# Patient Record
Sex: FEMALE | Race: BLACK OR AFRICAN AMERICAN | Employment: UNEMPLOYED | URBAN - METROPOLITAN AREA
[De-identification: names, ages, dates, MRNs, and addresses within clinical notes are randomized per-mention and may not be internally consistent; named-entity substitution may affect disease eponyms.]

---

## 2017-02-15 ENCOUNTER — HOSPITAL ENCOUNTER (EMERGENCY)
Facility: CLINIC | Age: 47
Discharge: HOME | End: 2017-02-15
Attending: EMERGENCY MEDICINE | Admitting: EMERGENCY MEDICINE
Payer: MEDICAID

## 2017-02-15 VITALS
TEMPERATURE: 98.1 F | DIASTOLIC BLOOD PRESSURE: 91 MMHG | RESPIRATION RATE: 20 BRPM | SYSTOLIC BLOOD PRESSURE: 147 MMHG | HEART RATE: 57 BPM | OXYGEN SATURATION: 100 %

## 2017-02-15 DIAGNOSIS — F32.A DEPRESSION, UNSPECIFIED DEPRESSION TYPE: ICD-10-CM

## 2017-02-15 DIAGNOSIS — F32.89 MINOR DEPRESSIVE DISORDER: Primary | ICD-10-CM

## 2017-02-15 DIAGNOSIS — F19.10 DRUG ABUSE (H): ICD-10-CM

## 2017-02-15 DIAGNOSIS — F19.10 POLYSUBSTANCE ABUSE (H): ICD-10-CM

## 2017-02-15 LAB
AMPHETAMINES UR QL SCN: NORMAL
BARBITURATES UR QL: NORMAL
BENZODIAZ UR QL: NORMAL
CANNABINOIDS UR QL SCN: NORMAL
COCAINE UR QL: NORMAL
ETHANOL UR QL SCN: NORMAL
HCG UR QL: NEGATIVE
OPIATES UR QL SCN: NORMAL

## 2017-02-15 PROCEDURE — 90791 PSYCH DIAGNOSTIC EVALUATION: CPT

## 2017-02-15 PROCEDURE — 99285 EMERGENCY DEPT VISIT HI MDM: CPT | Mod: 25 | Performed by: EMERGENCY MEDICINE

## 2017-02-15 PROCEDURE — 80320 DRUG SCREEN QUANTALCOHOLS: CPT | Performed by: EMERGENCY MEDICINE

## 2017-02-15 PROCEDURE — 80307 DRUG TEST PRSMV CHEM ANLYZR: CPT | Performed by: EMERGENCY MEDICINE

## 2017-02-15 PROCEDURE — 81025 URINE PREGNANCY TEST: CPT | Performed by: EMERGENCY MEDICINE

## 2017-02-15 PROCEDURE — 99284 EMERGENCY DEPT VISIT MOD MDM: CPT | Mod: Z6 | Performed by: EMERGENCY MEDICINE

## 2017-02-15 RX ORDER — FLUOXETINE 20 MG/5ML
40 SOLUTION ORAL DAILY
Qty: 300 ML | Refills: 0 | Status: ON HOLD | OUTPATIENT
Start: 2017-02-15 | End: 2020-04-25

## 2017-02-15 ASSESSMENT — ENCOUNTER SYMPTOMS
NERVOUS/ANXIOUS: 0
HALLUCINATIONS: 0
SLEEP DISTURBANCE: 0
DYSPHORIC MOOD: 1

## 2017-02-15 NOTE — ED AVS SNAPSHOT
[facility], Emergency Department    [address]    [facility] [place] [address]    Phone:  [phone]    Fax:  [phone]                                       Iliana Barrera   MRN: [id number]    Department:  [facility], Emergency Department   Date of Visit:  2/15/2017           Patient Information     Date Of Birth          1970        Your diagnoses for this visit were:     Depression, unspecified depression type     Polysubstance abuse        You were seen by Vernell Jenkins MD.        Discharge Instructions       Prozac refill given.     We spoke to [facility].  They said you can come back tomorrow morning between 6-10:30 am and they will see you and complete today's intended visit.     24 Hour Appointment Hotline       To make an appointment at any [facility], call [phone] ([phone]). If you don't have a family doctor or clinic, we will help you find one. [facility] are conveniently located to serve the needs of you and your family.             Review of your medicines      CONTINUE these medicines which may have CHANGED, or have new prescriptions. If we are uncertain of the size of tablets/capsules you have at home, strength may be listed as something that might have changed.        Dose / Directions Last dose taken    * FLUoxetine 20 MG/5ML solution   Commonly known as:  PROzac   Dose:  20 mg   What changed:  Another medication with the same name was added. Make sure you understand how and when to take each.   Quantity:  150 mL        Take 5 mLs (20 mg) by mouth daily   Refills:  2        * FLUoxetine 20 MG/5ML solution   Commonly known as:  PROzac   Dose:  40 mg   What changed:  You were already taking a medication with the same name, and this prescription was added. Make sure you understand how and when to take each.   Quantity:  300 mL        Take 10 mLs (40 mg) by mouth daily   Refills:  0        * Notice:  This list has 2 medication(s) that are the same as  other medications prescribed for you. Read the directions carefully, and ask your doctor or other care provider to review them with you.      Our records show that you are taking the medicines listed below. If these are incorrect, please call your family doctor or clinic.        Dose / Directions Last dose taken    acamprosate 333 MG EC tablet   Commonly known as:  CAMPRAL   Dose:  666 mg   Quantity:  180 tablet        Take 2 tablets (666 mg) by mouth 3 times daily   Refills:  2        albuterol 108 (90 BASE) MCG/ACT Inhaler   Commonly known as:  PROAIR HFA/PROVENTIL HFA/VENTOLIN HFA   Dose:  2 puff   Quantity:  1 Inhaler        Inhale 2 puffs into the lungs every 6 hours as needed for shortness of breath / dyspnea or wheezing   Refills:  12        ALLEGRA PO        Refills:  0        calcium carbonate 1250 (500 CA) MG/5ML Susp suspension        Refills:  0        cetirizine 10 MG tablet   Commonly known as:  zyrTEC   Dose:  10 mg   Quantity:  100 tablet        Take 1 tablet (10 mg) by mouth every evening   Refills:  11        cholecalciferol 70476 UNITS capsule   Commonly known as:  VITAMIN D3   Dose:  1 capsule   Quantity:  8 capsule        Take 1 capsule (50,000 Units) by mouth once a week   Refills:  1        ferrous sulfate 325 (65 FE) MG tablet   Commonly known as:  IRON   Dose:  325 mg   Quantity:  180 tablet        Take 1 tablet (325 mg) by mouth 2 times daily   Refills:  3        folic acid 1 MG tablet   Commonly known as:  FOLVITE   Dose:  1 mg   Quantity:  30 tablet        Take 1 tablet (1 mg) by mouth daily   Refills:  2        gabapentin 250 MG/5ML solution   Commonly known as:  NEURONTIN   Dose:  600 mg   Quantity:  450 mL        Take 12 mLs (600 mg) by mouth 3 times daily   Refills:  2        ibuprofen 200 MG tablet   Commonly known as:  ADVIL/MOTRIN   Dose:  600 mg   Quantity:  30 tablet        Take 3 tablets (600 mg) by mouth every 4 hours as needed   Refills:  0        MULTIVITAMIN Liqd        1  per day   Refills:  0        polyethylene glycol Packet   Commonly known as:  MIRALAX/GLYCOLAX   Dose:  17 g   Quantity:  30 packet        Take 17 g by mouth daily   Refills:  2        prazosin 1 MG capsule   Commonly known as:  MINIPRESS   Dose:  1 mg   Quantity:  30 capsule        Take 1 capsule (1 mg) by mouth At Bedtime   Refills:  2        * VITAMIN B 12 PO        Refills:  0        * cyanocobalamin 1000 MCG/ML injection   Commonly known as:  VITAMIN B12   Dose:  1 mL   Quantity:  1 mL        Inject 1 mL (1,000 mcg) into the muscle every 30 days   Refills:  12        ZANTAC 150 MG tablet   Dose:  1 tablet   Generic drug:  ranitidine        Take 1 tablet by mouth daily   Refills:  0        * Notice:  This list has 2 medication(s) that are the same as other medications prescribed for you. Read the directions carefully, and ask your doctor or other care provider to review them with you.            Prescriptions were sent or printed at these locations (1 Prescription)                   Other Prescriptions                Printed at Department/Unit printer (1 of 1)         FLUoxetine (PROZAC) 20 MG/5ML solution                Procedures and tests performed during your visit     Drug abuse screen 6 urine (tox)    HCG qualitative urine      Orders Needing Specimen Collection     None      Pending Results     No orders found from 2/13/2017 to 2/16/2017.            Pending Culture Results     No orders found from 2/13/2017 to 2/16/2017.            Thank you for choosing [facility]       Thank you for choosing [facility] for your care. Our goal is always to provide you with excellent care. Hearing back from our patients is one way we can continue to improve our services. Please take a few minutes to complete the written survey that you may receive in the mail after you visit with us. Thank you!        [employer] Information     [employer] lets you send messages to your doctor, view your test results, renew your prescriptions,  "schedule appointments and more. To sign up, go to [email or web address] . Click on \"Log in\" on the left side of the screen, which will take you to the Welcome page. Then click on \"Sign up Now\" on the right side of the page.     You will be asked to enter the access code listed below, as well as some personal information. Please follow the directions to create your username and password.     Your access code is: [email or web address]  Expires: 2017  1:21 PM     Your access code will  in 90 days. If you need help or a new code, please call your [facility] clinic or [phone].        Care EveryWhere ID     This is your Care EveryWhere ID. This could be used by other organizations to access your [facility] medical records  [id number]        After Visit Summary       This is your record. Keep this with you and show to your community pharmacist(s) and doctor(s) at your next visit.                  "

## 2017-02-15 NOTE — ED AVS SNAPSHOT
[facility], [place], Emergency Department    [address]    [facility] [address]    Phone:  [phone]    Fax:  [phone]                                       Iliana Barrera   MRN: [id number]    Department:  [facility], Emergency Department   Date of Visit:  2/15/2017           After Visit Summary Signature Page     I have received my discharge instructions, and my questions have been answered. I have discussed any challenges I see with this plan with the nurse or doctor.    ..........................................................................................................................................  Patient/Patient Representative Signature      ..........................................................................................................................................  Patient Representative Print Name and Relationship to Patient    ..................................................               ................................................  Date                                            Time    ..........................................................................................................................................  Reviewed by Signature/Title    ...................................................              ..............................................  Date                                                            Time

## 2017-02-15 NOTE — ED NOTES
Patient arrives to [facility]. Psych Associate explains process, gives patient urine cup and questionnaire. Patient told about meeting with Mental Health  and Psychiatrist. Patient told about 2-5 hour time frame for complete evaluation.

## 2017-02-15 NOTE — ED NOTES
Bed: ED09  Expected date: 2/15/17  Expected time: 11:18 AM  Means of arrival: Ambulance  Comments:  [facility]  48 yo Fe  depression

## 2017-02-15 NOTE — DISCHARGE INSTRUCTIONS
Prozac refill given.     We spoke to [facility].  They said you can come back tomorrow morning between 6-10:30 am and they will see you and complete today's intended visit.

## 2017-02-15 NOTE — ED PROVIDER NOTES
History     Chief Complaint   Patient presents with     Suicidal     off meds for 4 days, Hallucinations also.      DAILY Barrera is a 47 year old female with hx of depression and polysubstance abuse who presents from [facility] for safety evaluation.  She states she was there today to get set up with methadone maintenance. She recently had a rule 25 assessment and was referred to [facility].   They asked her a series of questions and asked if she had a suicidal plan.  She says she has one but doesn't have any plan or intention to due it.  She was there to get help and get better and they made her come here.  She takes prozac for depression and hasn't taken it for 4 days because it's locked up [employer] in a locked and she keeps missing the visiting times to get her med.  She locks it up there because she is staying at [facility] and things get stolen there.  She is homeless.  She has been living on the streets since December.   She drinks alcohol excessive and uses opiate pills.  She hasn't taken anything for at least 1 week.  She has chronic back pain.  She went through [facility] in 2015.     I have reviewed the Medications, Allergies, Past Medical and Surgical History, and Social History in the Epic system.    Review of Systems   Psychiatric/Behavioral: Positive for dysphoric mood. Negative for hallucinations, self-injury, sleep disturbance and suicidal ideas. The patient is not nervous/anxious.    All other systems reviewed and are negative.      Physical Exam   BP: (!) 147/91  Pulse: 57  Temp: 98.1  F (36.7  C)  Resp: 20  SpO2: 100 %  Physical Exam   Constitutional: She is oriented to person, place, and time. She appears well-developed and well-nourished. No distress.   HENT:   Head: Normocephalic and atraumatic.   Right Ear: External ear normal.   Left Ear: External ear normal.   Eyes: EOM are normal.   Neck: Normal range of motion. Neck supple.   Cardiovascular: Normal rate,  regular rhythm and normal heart sounds.    Pulmonary/Chest: Effort normal and breath sounds normal.   Abdominal: Soft. There is no tenderness.   Musculoskeletal: Normal range of motion.   Neurological: She is alert and oriented to person, place, and time.   Skin: Skin is warm and dry. She is not diaphoretic.   Psychiatric: Her speech is normal and behavior is normal. Judgment and thought content normal. Cognition and memory are normal. She exhibits a depressed mood.   Nursing note and vitals reviewed.      ED Course     ED Course     Procedures             Labs Ordered and Resulted from Time of ED Arrival Up to the Time of Departure from the ED   HCG QUALITATIVE URINE   DRUG ABUSE SCREEN 6 CHEM DEP URINE ([facility])       Assessments & Plan (with Medical Decision Making)   The patient presents from [facility] for safety evaluation.  She was there today to get set up on methadone maintenance.  During the evaluation they asked routine safety questions and she told them she had a suicidal plan.  She says she has thought about suicide but has no intention of doing it.  She was just being honest.  She was thinking the methadone was going to help here.  She was seen by myself and the DEC .  We do not feel she meets criteria for admission and is not a safety risk.  I will refill her prozac so she can resume taking it.  We spoke to [place] and then can see the patient tomorrow am between 6-11 to continue today's intended assessment.  The patient was d/c.     I have reviewed the nursing notes.    I have reviewed the findings, diagnosis, plan and need for follow up with the patient.    New Prescriptions    FLUOXETINE (PROZAC) 20 MG/5ML SOLUTION    Take 10 mLs (40 mg) by mouth daily       Final diagnoses:   Depression, unspecified depression type   Polysubstance abuse       2/15/2017   [facility] [place], EMERGENCY DEPARTMENT     Vernell Jenkins MD  02/15/17 2128

## 2019-05-08 ENCOUNTER — TRANSFERRED RECORDS (OUTPATIENT)
Dept: HEALTH INFORMATION MANAGEMENT | Facility: CLINIC | Age: 49
End: 2019-05-08

## 2020-04-23 ENCOUNTER — TELEPHONE (OUTPATIENT)
Dept: BEHAVIORAL HEALTH | Facility: CLINIC | Age: 50
End: 2020-04-23

## 2020-04-23 ENCOUNTER — HOSPITAL ENCOUNTER (INPATIENT)
Facility: CLINIC | Age: 50
LOS: 9 days | Discharge: HOME | End: 2020-05-02
Attending: EMERGENCY MEDICINE | Admitting: PSYCHIATRY & NEUROLOGY
Payer: COMMERCIAL

## 2020-04-23 DIAGNOSIS — R79.89 ELEVATED LFTS: ICD-10-CM

## 2020-04-23 DIAGNOSIS — F33.3 SEVERE EPISODE OF RECURRENT MAJOR DEPRESSIVE DISORDER, WITH PSYCHOTIC FEATURES (H): ICD-10-CM

## 2020-04-23 DIAGNOSIS — B99.9 INFECTION: Primary | ICD-10-CM

## 2020-04-23 DIAGNOSIS — R44.0 AUDITORY HALLUCINATIONS: ICD-10-CM

## 2020-04-23 DIAGNOSIS — F10.229 ALCOHOL INTOXICATION IN ACTIVE ALCOHOLIC WITH COMPLICATION (H): ICD-10-CM

## 2020-04-23 DIAGNOSIS — R45.851 SUICIDAL IDEATION: ICD-10-CM

## 2020-04-23 DIAGNOSIS — F10.24 ALCOHOL DEPENDENCE WITH ALCOHOL-INDUCED MOOD DISORDER (H): ICD-10-CM

## 2020-04-23 LAB
ALBUMIN SERPL-MCNC: 3.9 G/DL (ref 3.4–5)
ALCOHOL BREATH TEST: 0.18 (ref 0–0.01)
ALCOHOL BREATH TEST: 0.29 (ref 0–0.01)
ALP SERPL-CCNC: 101 U/L (ref 40–150)
ALT SERPL W P-5'-P-CCNC: 62 U/L (ref 0–50)
AMPHETAMINES UR QL SCN: NEGATIVE
ANION GAP SERPL CALCULATED.3IONS-SCNC: 6 MMOL/L (ref 3–14)
AST SERPL W P-5'-P-CCNC: 116 U/L (ref 0–45)
BARBITURATES UR QL: NEGATIVE
BASOPHILS # BLD AUTO: 0 10E9/L (ref 0–0.2)
BASOPHILS NFR BLD AUTO: 0.3 %
BENZODIAZ UR QL: NEGATIVE
BILIRUB SERPL-MCNC: 0.3 MG/DL (ref 0.2–1.3)
BUN SERPL-MCNC: 13 MG/DL (ref 7–30)
CALCIUM SERPL-MCNC: 9.4 MG/DL (ref 8.5–10.1)
CANNABINOIDS UR QL SCN: NEGATIVE
CHLORIDE SERPL-SCNC: 103 MMOL/L (ref 94–109)
CO2 SERPL-SCNC: 27 MMOL/L (ref 20–32)
COCAINE UR QL: NEGATIVE
CREAT SERPL-MCNC: 0.44 MG/DL (ref 0.52–1.04)
DIFFERENTIAL METHOD BLD: NORMAL
EOSINOPHIL # BLD AUTO: 0.1 10E9/L (ref 0–0.7)
EOSINOPHIL NFR BLD AUTO: 0.6 %
ERYTHROCYTE [DISTWIDTH] IN BLOOD BY AUTOMATED COUNT: 13.7 % (ref 10–15)
ETHANOL UR QL SCN: POSITIVE
GFR SERPL CREATININE-BSD FRML MDRD: >90 ML/MIN/{1.73_M2}
GLUCOSE SERPL-MCNC: 108 MG/DL (ref 70–99)
HCG UR QL: NEGATIVE
HCT VFR BLD AUTO: 39.9 % (ref 35–47)
HGB BLD-MCNC: 13.4 G/DL (ref 11.7–15.7)
IMM GRANULOCYTES # BLD: 0 10E9/L (ref 0–0.4)
IMM GRANULOCYTES NFR BLD: 0.1 %
LYMPHOCYTES # BLD AUTO: 2.8 10E9/L (ref 0.8–5.3)
LYMPHOCYTES NFR BLD AUTO: 36 %
MCH RBC QN AUTO: 28.4 PG (ref 26.5–33)
MCHC RBC AUTO-ENTMCNC: 33.6 G/DL (ref 31.5–36.5)
MCV RBC AUTO: 85 FL (ref 78–100)
MONOCYTES # BLD AUTO: 0.3 10E9/L (ref 0–1.3)
MONOCYTES NFR BLD AUTO: 3.2 %
NEUTROPHILS # BLD AUTO: 4.7 10E9/L (ref 1.6–8.3)
NEUTROPHILS NFR BLD AUTO: 59.8 %
NRBC # BLD AUTO: 0 10*3/UL
NRBC BLD AUTO-RTO: 0 /100
OPIATES UR QL SCN: NEGATIVE
PLATELET # BLD AUTO: 335 10E9/L (ref 150–450)
POTASSIUM SERPL-SCNC: 4 MMOL/L (ref 3.4–5.3)
PROT SERPL-MCNC: 8.9 G/DL (ref 6.8–8.8)
RBC # BLD AUTO: 4.72 10E12/L (ref 3.8–5.2)
SODIUM SERPL-SCNC: 136 MMOL/L (ref 133–144)
WBC # BLD AUTO: 7.8 10E9/L (ref 4–11)

## 2020-04-23 PROCEDURE — 80053 COMPREHEN METABOLIC PANEL: CPT | Performed by: EMERGENCY MEDICINE

## 2020-04-23 PROCEDURE — 90791 PSYCH DIAGNOSTIC EVALUATION: CPT

## 2020-04-23 PROCEDURE — 82075 ASSAY OF BREATH ETHANOL: CPT | Performed by: EMERGENCY MEDICINE

## 2020-04-23 PROCEDURE — 80307 DRUG TEST PRSMV CHEM ANLYZR: CPT | Performed by: FAMILY MEDICINE

## 2020-04-23 PROCEDURE — 99285 EMERGENCY DEPT VISIT HI MDM: CPT | Mod: Z6 | Performed by: EMERGENCY MEDICINE

## 2020-04-23 PROCEDURE — 12400001 ZZH R&B MH UMMC

## 2020-04-23 PROCEDURE — 85025 COMPLETE CBC W/AUTO DIFF WBC: CPT | Performed by: EMERGENCY MEDICINE

## 2020-04-23 PROCEDURE — 25000132 ZZH RX MED GY IP 250 OP 250 PS 637: Performed by: EMERGENCY MEDICINE

## 2020-04-23 PROCEDURE — 99285 EMERGENCY DEPT VISIT HI MDM: CPT | Mod: 25 | Performed by: EMERGENCY MEDICINE

## 2020-04-23 PROCEDURE — 81025 URINE PREGNANCY TEST: CPT | Performed by: FAMILY MEDICINE

## 2020-04-23 PROCEDURE — 25000132 ZZH RX MED GY IP 250 OP 250 PS 637: Performed by: STUDENT IN AN ORGANIZED HEALTH CARE EDUCATION/TRAINING PROGRAM

## 2020-04-23 PROCEDURE — 82075 ASSAY OF BREATH ETHANOL: CPT | Mod: 91 | Performed by: EMERGENCY MEDICINE

## 2020-04-23 PROCEDURE — 80320 DRUG SCREEN QUANTALCOHOLS: CPT | Performed by: FAMILY MEDICINE

## 2020-04-23 PROCEDURE — HZ2ZZZZ DETOXIFICATION SERVICES FOR SUBSTANCE ABUSE TREATMENT: ICD-10-PCS | Performed by: EMERGENCY MEDICINE

## 2020-04-23 RX ORDER — MAGNESIUM OXIDE 400 MG/1
800 TABLET ORAL ONCE
Status: COMPLETED | OUTPATIENT
Start: 2020-04-23 | End: 2020-04-23

## 2020-04-23 RX ORDER — POLYETHYLENE GLYCOL 3350 17 G
2-4 POWDER IN PACKET (EA) ORAL
Status: DISCONTINUED | OUTPATIENT
Start: 2020-04-23 | End: 2020-05-02 | Stop reason: HOSPADM

## 2020-04-23 RX ORDER — LANOLIN ALCOHOL/MO/W.PET/CERES
100 CREAM (GRAM) TOPICAL DAILY
Status: DISCONTINUED | OUTPATIENT
Start: 2020-04-24 | End: 2020-05-02 | Stop reason: HOSPADM

## 2020-04-23 RX ORDER — LANOLIN ALCOHOL/MO/W.PET/CERES
100 CREAM (GRAM) TOPICAL ONCE
Status: COMPLETED | OUTPATIENT
Start: 2020-04-23 | End: 2020-04-23

## 2020-04-23 RX ORDER — ALBUTEROL SULFATE 0.83 MG/ML
2.5 SOLUTION RESPIRATORY (INHALATION) EVERY 6 HOURS PRN
Status: DISCONTINUED | OUTPATIENT
Start: 2020-04-23 | End: 2020-05-02 | Stop reason: HOSPADM

## 2020-04-23 RX ORDER — MULTIPLE VITAMINS W/ MINERALS TAB 9MG-400MCG
1 TAB ORAL DAILY
Status: DISCONTINUED | OUTPATIENT
Start: 2020-04-24 | End: 2020-05-02 | Stop reason: HOSPADM

## 2020-04-23 RX ORDER — HYDROXYZINE HYDROCHLORIDE 25 MG/1
25 TABLET, FILM COATED ORAL EVERY 4 HOURS PRN
Status: DISCONTINUED | OUTPATIENT
Start: 2020-04-23 | End: 2020-05-02 | Stop reason: HOSPADM

## 2020-04-23 RX ORDER — ALUMINA, MAGNESIA, AND SIMETHICONE 2400; 2400; 240 MG/30ML; MG/30ML; MG/30ML
30 SUSPENSION ORAL EVERY 4 HOURS PRN
Status: DISCONTINUED | OUTPATIENT
Start: 2020-04-23 | End: 2020-05-02 | Stop reason: HOSPADM

## 2020-04-23 RX ORDER — LORAZEPAM 1 MG/1
1-4 TABLET ORAL EVERY 30 MIN PRN
Status: DISCONTINUED | OUTPATIENT
Start: 2020-04-23 | End: 2020-04-27

## 2020-04-23 RX ORDER — FOLIC ACID 1 MG/1
1 TABLET ORAL ONCE
Status: COMPLETED | OUTPATIENT
Start: 2020-04-23 | End: 2020-04-23

## 2020-04-23 RX ORDER — MULTIVITAMIN,THERAPEUTIC
1 TABLET ORAL ONCE
Status: COMPLETED | OUTPATIENT
Start: 2020-04-23 | End: 2020-04-23

## 2020-04-23 RX ORDER — OLANZAPINE 10 MG/2ML
10 INJECTION, POWDER, FOR SOLUTION INTRAMUSCULAR
Status: DISCONTINUED | OUTPATIENT
Start: 2020-04-23 | End: 2020-05-02 | Stop reason: HOSPADM

## 2020-04-23 RX ORDER — FOLIC ACID 1 MG/1
1 TABLET ORAL DAILY
Status: DISCONTINUED | OUTPATIENT
Start: 2020-04-24 | End: 2020-05-02 | Stop reason: HOSPADM

## 2020-04-23 RX ORDER — ACETAMINOPHEN 325 MG/1
650 TABLET ORAL EVERY 4 HOURS PRN
Status: DISCONTINUED | OUTPATIENT
Start: 2020-04-23 | End: 2020-05-02 | Stop reason: HOSPADM

## 2020-04-23 RX ORDER — OLANZAPINE 10 MG/1
10 TABLET ORAL
Status: DISCONTINUED | OUTPATIENT
Start: 2020-04-23 | End: 2020-05-02 | Stop reason: HOSPADM

## 2020-04-23 RX ADMIN — FOLIC ACID 1 MG: 1 TABLET ORAL at 18:45

## 2020-04-23 RX ADMIN — THIAMINE HCL TAB 100 MG 100 MG: 100 TAB at 18:45

## 2020-04-23 RX ADMIN — Medication 800 MG: at 18:45

## 2020-04-23 RX ADMIN — LORAZEPAM 1 MG: 1 TABLET ORAL at 23:11

## 2020-04-23 RX ADMIN — THERA TABS 1 TABLET: TAB at 18:45

## 2020-04-23 ASSESSMENT — ENCOUNTER SYMPTOMS
CONFUSION: 0
FEVER: 0
DIFFICULTY URINATING: 0
NAUSEA: 0
ABDOMINAL PAIN: 0
CHILLS: 0
VOMITING: 0
EYE REDNESS: 0
DYSPHORIC MOOD: 1
SHORTNESS OF BREATH: 0
DIARRHEA: 0
HALLUCINATIONS: 1
COLOR CHANGE: 0
HEADACHES: 0
ARTHRALGIAS: 0

## 2020-04-23 NOTE — TELEPHONE ENCOUNTER
S: Pt is a 50 yr old fem in [facility] for detox from alcohol report by Dr. Nguyen    B: Pt reports drinking 2 pints and 2 18 pks of beer daily.  Breathalyzer .289.  Pt also reports using Percocet and Oxy when she can.  Pt reports she was kicked out of her sober house earlier this month.  No reported hx of DTs or seizures.  Pt reports passive SI w/ no plan or intent.  Medical: asthma and gastric bypass.   reports pt can ambulate independently.      A: vol     R:    Patient cleared and ready for behavioral bed placement: Yes
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
## 2020-04-24 LAB
FOLATE SERPL-MCNC: 30.8 NG/ML
TSH SERPL DL<=0.005 MIU/L-ACNC: 1.15 MU/L (ref 0.4–4)
VIT B12 SERPL-MCNC: 198 PG/ML (ref 193–986)

## 2020-04-24 PROCEDURE — 36415 COLL VENOUS BLD VENIPUNCTURE: CPT | Performed by: PSYCHIATRY & NEUROLOGY

## 2020-04-24 PROCEDURE — 25000132 ZZH RX MED GY IP 250 OP 250 PS 637: Performed by: STUDENT IN AN ORGANIZED HEALTH CARE EDUCATION/TRAINING PROGRAM

## 2020-04-24 PROCEDURE — 82607 VITAMIN B-12: CPT | Performed by: PSYCHIATRY & NEUROLOGY

## 2020-04-24 PROCEDURE — 99223 1ST HOSP IP/OBS HIGH 75: CPT | Mod: AI | Performed by: PSYCHIATRY & NEUROLOGY

## 2020-04-24 PROCEDURE — 12400001 ZZH R&B MH UMMC

## 2020-04-24 PROCEDURE — 82306 VITAMIN D 25 HYDROXY: CPT | Performed by: PSYCHIATRY & NEUROLOGY

## 2020-04-24 PROCEDURE — H2032 ACTIVITY THERAPY, PER 15 MIN: HCPCS

## 2020-04-24 PROCEDURE — 25000128 H RX IP 250 OP 636: Performed by: STUDENT IN AN ORGANIZED HEALTH CARE EDUCATION/TRAINING PROGRAM

## 2020-04-24 PROCEDURE — 82746 ASSAY OF FOLIC ACID SERUM: CPT | Performed by: PSYCHIATRY & NEUROLOGY

## 2020-04-24 PROCEDURE — 84443 ASSAY THYROID STIM HORMONE: CPT | Performed by: PSYCHIATRY & NEUROLOGY

## 2020-04-24 RX ORDER — GABAPENTIN 250 MG/5ML
300 SOLUTION ORAL 3 TIMES DAILY
Status: DISCONTINUED | OUTPATIENT
Start: 2020-04-24 | End: 2020-04-26

## 2020-04-24 RX ORDER — ONDANSETRON 4 MG/1
4 TABLET, FILM COATED ORAL EVERY 6 HOURS PRN
Status: DISCONTINUED | OUTPATIENT
Start: 2020-04-24 | End: 2020-05-02 | Stop reason: HOSPADM

## 2020-04-24 RX ORDER — CHOLECALCIFEROL (VITAMIN D3) 1250 MCG
50000 CAPSULE ORAL WEEKLY
Status: DISCONTINUED | OUTPATIENT
Start: 2020-04-24 | End: 2020-05-02 | Stop reason: HOSPADM

## 2020-04-24 RX ORDER — TRAZODONE HYDROCHLORIDE 50 MG/1
50 TABLET, FILM COATED ORAL
Status: DISCONTINUED | OUTPATIENT
Start: 2020-04-24 | End: 2020-05-02 | Stop reason: HOSPADM

## 2020-04-24 RX ORDER — GABAPENTIN 250 MG/5ML
600 SOLUTION ORAL 3 TIMES DAILY
Status: DISCONTINUED | OUTPATIENT
Start: 2020-04-24 | End: 2020-04-24

## 2020-04-24 RX ORDER — QUETIAPINE FUMARATE 50 MG/1
50-100 TABLET, FILM COATED ORAL AT BEDTIME
Status: DISCONTINUED | OUTPATIENT
Start: 2020-04-24 | End: 2020-04-27

## 2020-04-24 RX ADMIN — LORAZEPAM 1 MG: 1 TABLET ORAL at 14:16

## 2020-04-24 RX ADMIN — QUETIAPINE FUMARATE 50 MG: 50 TABLET ORAL at 21:12

## 2020-04-24 RX ADMIN — ONDANSETRON HYDROCHLORIDE 4 MG: 4 TABLET, FILM COATED ORAL at 21:16

## 2020-04-24 RX ADMIN — THIAMINE HCL TAB 100 MG 100 MG: 100 TAB at 14:09

## 2020-04-24 RX ADMIN — LORAZEPAM 2 MG: 1 TABLET ORAL at 10:34

## 2020-04-24 RX ADMIN — MULTIPLE VITAMINS W/ MINERALS TAB 1 TABLET: TAB at 14:10

## 2020-04-24 RX ADMIN — LORAZEPAM 2 MG: 1 TABLET ORAL at 06:23

## 2020-04-24 RX ADMIN — GABAPENTIN 300 MG: 250 SUSPENSION ORAL at 19:58

## 2020-04-24 RX ADMIN — TRAZODONE HYDROCHLORIDE 50 MG: 50 TABLET ORAL at 19:58

## 2020-04-24 RX ADMIN — FOLIC ACID 1 MG: 1 TABLET ORAL at 14:10

## 2020-04-24 RX ADMIN — ONDANSETRON HYDROCHLORIDE 4 MG: 4 TABLET, FILM COATED ORAL at 10:33

## 2020-04-24 RX ADMIN — CHOLECALCIFEROL CAP 1.25 MG (50000 UNIT) 50000 UNITS: 1.25 CAP at 19:58

## 2020-04-24 ASSESSMENT — ACTIVITIES OF DAILY LIVING (ADL)
LAUNDRY: WITH SUPERVISION
DRESS: SCRUBS (BEHAVIORAL HEALTH)
DRESS: SCRUBS (BEHAVIORAL HEALTH)
ORAL_HYGIENE: INDEPENDENT
ORAL_HYGIENE: INDEPENDENT
HYGIENE/GROOMING: INDEPENDENT
HYGIENE/GROOMING: PROMPTS

## 2020-04-24 NOTE — PLAN OF CARE
BEHAVIORAL TEAM DISCUSSION    Participants: Dr. Norton, Dr. Purcell (Resident), Dr. Ospina (Resident), Kenzie Menjivar (OT), Sudhir (RN), Kiley (CTC), Na Cotter (CTC)    Progress: Continuing to assess.     Anticipated length of stay: 3-5 days.     Continued Stay Criteria/Rationale: Assessment, evaluation, and recommendations.     Medical/Physical: Asthma.  Precautions:   Behavioral Orders   Procedures     Code 1 - Restrict to Unit     Routine Programming     As clinically indicated     Seizure precautions     Self Injury Precaution     Status 15     Every 15 minutes.     Suicide precautions     Patients on Suicide Precautions should have a Combination Diet ordered that includes a Diet selection(s) AND a Behavioral Tray selection for Safe Tray - with utensils, or Safe Tray - NO utensils       Withdrawal precautions     Plan: The plan is to assess the patient for mental health and medication needs. The patient will be prescribed medications to treat the identified symptoms. Patient will participate in therapeutic skill building groups on the unit. CTC to coordinate discharge/after care planning.     Rationale for change in precautions or plan: None.

## 2020-04-24 NOTE — PROGRESS NOTES
Iliana scored a 6, and later, an 8 on her MSSA. Ativan 2mg prn given. She has a pulse between 98 and 100 but BP within normal limits.  She has been up a few times during the night shift to use the bathroom.  No further emesis during the night.  No emergent s/sxs at this time.
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
## 2020-04-24 NOTE — PROGRESS NOTES
Pt spent most of her time in room sleeping and out for phone calls. She denies suicidal ideations, hallucinations or SIB. She reports being depressed and appears somewhat disoriented when talking to her. She appears cooperative and pleasant.  No groups this shift and no behavior issues.     04/24/20 1300   Behavioral Health   Hallucinations denies / not responding to hallucinations   Thinking poor concentration;distractable   Orientation time: oriented;date: oriented;place: oriented;person: oriented   Memory baseline memory   Insight poor   Judgement impaired   Eye Contact at examiner   Affect blunted, flat   Mood depressed;mood is calm   Physical Appearance/Attire appears stated age   Hygiene body odor   Suicidality other (see comments)  (Denies)   1. Wish to be Dead (Recent) No   2. Non-Specific Active Suicidal Thoughts (Recent) No   Self Injury other (see comment)  (Denies)   Speech coherent;clear   Medication Sensitivity no stated side effects   Psychomotor / Gait unsteady;slow   Psycho Education   Type of Intervention 1:1 intervention   Response participates, initiates socially appropriate   Hours 0.5   Activities of Daily Living   Hygiene/Grooming independent   Oral Hygiene independent   Dress scrubs (behavioral health)   Laundry with supervision   Room Organization independent   Activity   Activity Assistance Provided independent

## 2020-04-24 NOTE — PROGRESS NOTES
04/23/20 2248   Valuables   Patient Belongings locker   Patient Belongings Put in Hospital Secure Location (Security or Locker, etc.) cell phone/electronics;clothing;money (see comment);plastic bag;shoes     1 black boots   1 blue sweater  1 tictac  1 phone (black)  1 black jacket with pads, 1 dollar, some change, a lock, baby lotion in pockets.  1 red shirt  1 pink underwear  1 jeans  1 grey bra  Baby wipes  A plastic clear bag filled with snacks, shampoo/conditioner, toothpaste and other essential items.  1 vacuum can.    A               Admission:  I am responsible for any personal items that are not sent to the safe or pharmacy.  [facility] is not responsible for loss, theft or damage of any property in my possession.    Signature:  _________________________________ Date: _______  Time: _____                                              Staff Signature:  ____________________________ Date: ________  Time: _____      2nd Staff person, if patient is unable/unwilling to sign:    Signature: ________________________________ Date: ________  Time: _____     Discharge:  [facility] has returned all of my personal belongings:    Signature: _________________________________ Date: ________  Time: _____                                          Staff Signature:  ____________________________ Date: ________  Time: _____
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
## 2020-04-24 NOTE — PROGRESS NOTES
Diagnosis/Procedure:   Patient Active Problem List   Diagnosis     Menorrhagia     Hx of gastric bypass     Hyperlipidemia LDL goal <160     BMI 37.0-37.9, adult     Intermittent asthma     Iron deficiency anemia     Other allergic rhinitis     Constipation, unspecified constipation type     S/P alcohol detoxification     MDD (major depressive disorder)     Opioid abuse (H)     Vitamin B 12 deficiency     Alcohol use disorder     Suicidal intent         Work Completed: Clinical team meeting, initial psychosocial, and team note.     Discharge Plan or Goal: Unknown at this time.     Barriers to Discharge: The patient is currently experiencing withdrawal symptoms so discharge is unknown at this time.
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
## 2020-04-24 NOTE — PROGRESS NOTES
Pt was admitted to unit form the ED with alcohol. She scored 8 on her MSSA. She was given ativan 1 mg. She had emesis x1.  She was able to contract for safety. Writer was unable to complete admission because pt was tired and disorganized thought process.

## 2020-04-25 PROCEDURE — 12400001 ZZH R&B MH UMMC

## 2020-04-25 PROCEDURE — 25000132 ZZH RX MED GY IP 250 OP 250 PS 637: Performed by: STUDENT IN AN ORGANIZED HEALTH CARE EDUCATION/TRAINING PROGRAM

## 2020-04-25 RX ORDER — MULTIVITAMIN,THERAPEUTIC
1 TABLET ORAL DAILY
COMMUNITY

## 2020-04-25 RX ORDER — FERROUS GLUCONATE 324(37.5)
1 TABLET ORAL 3 TIMES DAILY
COMMUNITY

## 2020-04-25 RX ORDER — CITALOPRAM HYDROBROMIDE 20 MG/1
20 TABLET ORAL DAILY
Status: ON HOLD | COMMUNITY
End: 2020-05-01

## 2020-04-25 RX ORDER — TRAZODONE HYDROCHLORIDE 50 MG/1
50 TABLET, FILM COATED ORAL AT BEDTIME
COMMUNITY

## 2020-04-25 RX ORDER — LORATADINE 10 MG/1
10 TABLET ORAL DAILY
COMMUNITY

## 2020-04-25 RX ADMIN — GABAPENTIN 300 MG: 250 SUSPENSION ORAL at 21:10

## 2020-04-25 RX ADMIN — MAGNESIUM HYDROXIDE 30 ML: 400 SUSPENSION ORAL at 19:19

## 2020-04-25 RX ADMIN — FOLIC ACID 1 MG: 1 TABLET ORAL at 09:17

## 2020-04-25 RX ADMIN — MULTIPLE VITAMINS W/ MINERALS TAB 1 TABLET: TAB at 09:17

## 2020-04-25 RX ADMIN — LORAZEPAM 2 MG: 1 TABLET ORAL at 07:15

## 2020-04-25 RX ADMIN — THIAMINE HCL TAB 100 MG 100 MG: 100 TAB at 09:18

## 2020-04-25 RX ADMIN — NICOTINE POLACRILEX 4 MG: 2 LOZENGE ORAL at 09:18

## 2020-04-25 RX ADMIN — GABAPENTIN 300 MG: 250 SUSPENSION ORAL at 15:35

## 2020-04-25 RX ADMIN — GABAPENTIN 300 MG: 250 SUSPENSION ORAL at 09:18

## 2020-04-25 RX ADMIN — QUETIAPINE FUMARATE 50 MG: 50 TABLET ORAL at 21:10

## 2020-04-25 ASSESSMENT — ACTIVITIES OF DAILY LIVING (ADL)
DRESS: INDEPENDENT
DRESS: SCRUBS (BEHAVIORAL HEALTH);INDEPENDENT
LAUNDRY: WITH SUPERVISION
ORAL_HYGIENE: INDEPENDENT
HYGIENE/GROOMING: SHOWER;INDEPENDENT
ORAL_HYGIENE: INDEPENDENT
HYGIENE/GROOMING: INDEPENDENT

## 2020-04-25 ASSESSMENT — MIFFLIN-ST. JEOR: SCORE: 1605.16

## 2020-04-25 NOTE — PROGRESS NOTES
Patient reports continued depression and auditory hallucinations, denies current SI/SIB. Patient isolated to her room, staying in bed almost all shift, except for dinner and attending a music therapy group. Patient presented with flat affect and moved very slowly, appearing sedated.     04/24/20 2200   Behavioral Health   Hallucinations auditory   Thinking distractable;poor concentration   Orientation person: oriented;place: oriented;date: oriented;time: oriented   Memory new learning, recall loss   Insight admits / accepts   Judgement impaired   Eye Contact at floor   Affect blunted, flat   Mood depressed   Physical Appearance/Attire disheveled   Hygiene neglected grooming - unclean body, hair, teeth   Suicidality other (see comments)  (Denies)   1. Wish to be Dead (Recent) No   2. Non-Specific Active Suicidal Thoughts (Recent) No   Self Injury other (see comment)  (Denies)   Activity isolative;withdrawn   Speech clear;coherent   Medication Sensitivity no stated side effects;no observed side effects   Psychomotor / Gait slow   Activities of Daily Living   Hygiene/Grooming prompts   Oral Hygiene independent   Dress scrubs (behavioral health)   Room Organization independent
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
## 2020-04-25 NOTE — PROVIDER NOTIFICATION
Pt been isolative to self.  Not observe here talking to other patients. Pt denies SI.  Pt did report auditory hallucinations.  Pt asking when she was going to take medications for them.  She did take a shower.  MSSA 5/5.  Eating well. No s/sx's if alcohol withdrawel

## 2020-04-25 NOTE — PROGRESS NOTES
CLINICAL NUTRITION SERVICES - ASSESSMENT NOTE     Nutrition Prescription    RECOMMENDATIONS FOR MDs/PROVIDERS TO ORDER:  In light of labs below, no suggested additional vitamin supplementation needed at this time, recommended supplementation is already ordered by Provider     As pt's bypass was preformed >5 years ago, RD has no other specific recommendations for diet, pt should continue regular diet as ordered and continue vitamin supplementation as ordered     Malnutrition Status:    Unable to determine due to no face to face patient visit     Recommendations already ordered by Registered Dietitian (RD):  None - requested nursing staff obtain current height and weight     Future/Additional Recommendations:  Monitor meal intakes  Monitor weight trends      REASON FOR ASSESSMENT  Iliana Barrera is a/an 50 year old female assessed by the dietitian for Provider Order - Patient has a history of gastric bypass. Please make recommendations on diet and supplement    NUTRITION/MEDICAL HISTORY  Per chart review: Pt admits to facility in the setting of SI and substance abuse. Pt with history of schizophrenia, depression, anxiety, polysubstance use disorder, and post traumatic stress disorder. Past medical history of gastric bypass noted, two different dates in the chart, was done in either 2012 or 2015.     No face to face visits in light of reduced in-person exposure during COVID outbreak. RD contacted pt's RN via phone, RN reports pt is asleep and did not want to disturb, so no patient visit today. Discussed pt with RN, reviewed need for weight and ht in the chart, reviewed weights from below. RN reports pt is ordering foods and taking meals and does not appear poorly nourished, RN did not feel oral nutrition supplement was needed at this time. RN reports pt is over selecting on menus, but agreeing to what  provides. RD reviewed above suggestions to Provider as well, pt is on appropriate vitamin  "supplementation, RD would have no other recommendations at this time.     CURRENT NUTRITION ORDERS  Diet: Regular  Intake/Tolerance: No concerns with poor intakes at this time per staff report     LABS  Results for HERB RODRIGUEZ (MRN [id number]) as of 4/25/2020 11:16   4/23/2020 17:30 4/24/2020 07:30   Sodium 136    Potassium 4.0    Chloride 103    Carbon Dioxide 27    Urea Nitrogen 13    Creatinine 0.44 (L)    Calcium 9.4    Albumin 3.9    Protein Total 8.9 (H)    Bilirubin Total 0.3    Alkaline Phosphatase 101    ALT 62 (H)     (H)    Folate  30.8   TSH  1.15   Vitamin B12  198     MEDICATIONS  Folvite, MVI, Vit D, Thiamine     ANTHROPOMETRICS  Weight 102.1 kg (225 lb) 03/13/2020 Per CE     Height 162.6 cm (5' 4\") 03/13/2020 Per CE    IBW: 54.5 kg  BMI: Obesity Grade II BMI 35-39.9  Weight History:   Wt Readings from Last 10 Encounters:   12/14/15 101.2 kg (223 lb)   06/30/15 98.9 kg (218 lb)   01/31/14 94.3 kg (208 lb)   01/10/14 94.8 kg (209 lb)     Dosing Weight: 66.3 kg - adjusted BW from weights available above     ASSESSED NUTRITION NEEDS  Estimated Energy Needs: 0643-6447 kcals/day (25 - 30 kcals/kg)  Justification: Maintenance/obese   Estimated Protein Needs: 65 grams protein/day (1 grams of pro/kg)  Justification: Maintenance/obese   Estimated Fluid Needs: 1990 mL/day (30 mL/kg)   Justification: Maintenance    MALNUTRITION  % Intake: Intake does not meet criteria at this time   % Weight Loss: Unable to assess/none noted in weight history   Subcutaneous Fat Loss: Unable to assess  Muscle Loss: Unable to assess  Fluid Accumulation/Edema: None noted  Malnutrition Diagnosis: Unable to determine due to no face to face patient visit     NUTRITION DIAGNOSIS  Predicted inadequate nutrient intake prior to admission related to substance abuse     INTERVENTIONS  Implementation  Collaboration with other providers     Goals  Patient to consume % of nutritionally adequate meal trays TID, or the " equivalent with supplements/snacks.     Monitoring/Evaluation  Progress toward goals will be monitored and evaluated per protocol.

## 2020-04-25 NOTE — PROGRESS NOTES
"   04/24/20 2000   Music Therapy   Type of Participation Music therapy group   Response Participates with encouragement   Treatment Detail .75     Iliana's individualized treatment goal for today's session: Assessment/Relaxation through music    Participated in therapeutic music listening group with focus on providing for autonomy and choice, memory recall, self-expression, positive socialization, and a reduction of depressive sxs.     Iliana entered group appearing dazed with a glazed expression.  She sat down by the window.  Music Therapist asked if there was a song she would like to hear/is helpful to her.   \"You probably wouldn't have it\", she mumbled.  Music Therapist said, \"Well, let's try..\" and was able to find the song she had in mind, a Gospel song by Joshua [email or web address].  She then requested a romantic song after that and listened to peer's selections as well. She attended 75 % of the session, but became tired towards the end of group and requested to leave.      While listening to the music, she laid her head down on the table, and periodically blew her nose.  She was calm and cooperative, though a bit distant.  "

## 2020-04-25 NOTE — PHARMACY-ADMISSION MEDICATION HISTORY
"Admission medication history for the April 25, 2020 admission is complete.     Interview Sources:    Patient    Dispense Report    [facility] in [place], [place] - [phone]    Changes made to PTA medication list (reason)  Added:     Citalopram 20 mg tabs; 1T PO daily (per patient and fill history)    Loratadine 10 mg tabs; 1T PO daily (per fill hx)    Multivitamin tabs; 1T PO every day (per patient)    Trazodone 50 mg tabs; 1T PO at bedtime (per patient and fill hx)  Deleted:     Acamprosate 333 mg tabs; 2T PO TID (no fill history in the past year)    Cetirizine 10 mg tabs; 1T PO every day (no fill history in the past year)    Cholecalciferol 50,000 unit caps; 1C PO once weekly (no fill hx; script from 2015)    Fexofenadine; PO (no fill history in the past year)    Fluoxetine 20 mg/5 mL solution; take 5 mL PO daily (no fill hx; script from 2015)    Fluoxetine 20 mg/5 mL solution; take 10 mL PO daily (no fill hx; script from 2017)    Gabapentin 250 mg/5 mL solution; take 12 mL PO TID (no fill hx; script from 2015)    Ibuprofen 200 mg tabs; 3T PO Q4H PRN (no fill hx; script from 2015)    Polyethylene glycol packet; 17 g PO every day (no fill hx; script from 2015)    Folic acid 1 mg tabs; 1T PO every day (no fill hx; script from 2015)  Changed:     Ferrous sulfate 325 mg tabs; 1T PO BID --> ferrous gluconate 324 mg tabs; 1T PO TID (per fill hx)    Calcium carbonate 1250 mg/5 mL susp; PO --> 600 mg tabs; 1T PO BID (per fill hx)    Ranitidine 150 mg tabs; 1T PO every day --> 1T PO BID (per fill hx)    Additional medication history information:     Patient reported the use of 4 \"base\" medications: prazosin, trazodone, citalopram, and an additional psych medication that she could not recall the name of. Unable to find fill hx or mention of new psych med in recent notes via Chart Review.    Patient reported the use of various OTC meds/vitamins/supplements and was able to list off iron, B12, a multivitamin, and an OTC " allergy medication, but was unable to recall any other names or any strengths/dosages.     Reliability of Source: poor; patient was unable to recall most medication names or strengths. Patient reports recent use of her medications, however she has no fill history within the past 6 months.     Medication Adherence: patient reports she has not taken any medications in ~ 1 week. She reports struggling with adherence as she is homeless and has a hard time keeping track of her medications.    Current Outpatient Pharmacy: most recent fill history from 2019 shows fills at [id number] in [place], [place]. However, this pharmacy reported they had no fill history for this patient in her chart.     Prior to Admission Medication List:  Prior to Admission medications    Medication Sig Last Dose Last Fill Date at Pharmacy Auth Provider   calcium carbonate (OS-JOSE DE JESUS) 1500 (600 Ca) MG tablet Take by mouth 2 times daily (with meals) Past Week 4/29/2019 Unknown, Entered By History   Citalopram (Celexa) 20 mg tabs Take 1 tablet by mouth daily Past Week 6/8/2019 Reported, patient   Cyanocobalamin (VITAMIN B 12 PO) Take by mouth daily  Past Week N/A Reported, Patient   Ferrous Gluconate 324 (37.5 Fe) MG TABS Take 1 tablet by mouth 3 times daily Past Week 6/4/2019 Unknown, Entered By History   multivitamin, therapeutic (THERA-VIT) TABS tablet Take 1 tablet by mouth daily Past Week N/A Unknown, Entered By History   Loratadine (Claritin) 10 mg tabs Take 1 tablet by mouth daily Past Week 7/27/2019 Reported, patient   prazosin (MINIPRESS) 1 MG capsule Take 1 capsule (1 mg) by mouth At Bedtime Past Week 6/4/2019 Otilio Manjarrez MD   ranitidine (ZANTAC) 150 MG tablet Take 1 tablet by mouth 2 times daily  Past Week 6/4/2019 Reported, Patient   traZODone (DESYREL) 50 MG tablet Take 50 mg by mouth At Bedtime Past Week 6/4/2019 Unknown, Entered By History   albuterol (PROAIR HFA, PROVENTIL HFA, VENTOLIN HFA) 108 (90 BASE) MCG/ACT inhaler Inhale 2  puffs into the lungs every 6 hours as needed for shortness of breath / dyspnea or wheezing Unknown 7/27/2019 Aldair Barrientos MD     Medication history completed by:   Rachel Bragg, Pharmacy Intern

## 2020-04-26 LAB — DEPRECATED CALCIDIOL+CALCIFEROL SERPL-MC: 20 UG/L (ref 20–75)

## 2020-04-26 PROCEDURE — 12400001 ZZH R&B MH UMMC

## 2020-04-26 PROCEDURE — 25000132 ZZH RX MED GY IP 250 OP 250 PS 637: Performed by: STUDENT IN AN ORGANIZED HEALTH CARE EDUCATION/TRAINING PROGRAM

## 2020-04-26 RX ORDER — AMOXICILLIN 250 MG
1 CAPSULE ORAL AT BEDTIME
Status: DISCONTINUED | OUTPATIENT
Start: 2020-04-26 | End: 2020-05-02 | Stop reason: HOSPADM

## 2020-04-26 RX ORDER — FERROUS SULFATE 325(65) MG
325 TABLET ORAL DAILY
Status: DISCONTINUED | OUTPATIENT
Start: 2020-04-26 | End: 2020-05-01

## 2020-04-26 RX ADMIN — THIAMINE HCL TAB 100 MG 100 MG: 100 TAB at 08:29

## 2020-04-26 RX ADMIN — FERROUS SULFATE TAB 325 MG (65 MG ELEMENTAL FE) 325 MG: 325 (65 FE) TAB at 11:02

## 2020-04-26 RX ADMIN — MAGNESIUM HYDROXIDE 30 ML: 400 SUSPENSION ORAL at 13:03

## 2020-04-26 RX ADMIN — QUETIAPINE FUMARATE 100 MG: 50 TABLET ORAL at 21:06

## 2020-04-26 RX ADMIN — MULTIPLE VITAMINS W/ MINERALS TAB 1 TABLET: TAB at 08:29

## 2020-04-26 RX ADMIN — GABAPENTIN 300 MG: 250 SUSPENSION ORAL at 08:29

## 2020-04-26 RX ADMIN — SENNOSIDES AND DOCUSATE SODIUM 1 TABLET: 8.6; 5 TABLET ORAL at 21:07

## 2020-04-26 RX ADMIN — FOLIC ACID 1 MG: 1 TABLET ORAL at 08:29

## 2020-04-26 ASSESSMENT — ACTIVITIES OF DAILY LIVING (ADL)
DRESS: SCRUBS (BEHAVIORAL HEALTH);INDEPENDENT
HYGIENE/GROOMING: INDEPENDENT
ORAL_HYGIENE: INDEPENDENT

## 2020-04-26 NOTE — PROGRESS NOTES
04/25/20 2000   Behavioral Health   Hallucinations auditory   Thinking distractable   Orientation person: oriented;place: oriented   Memory baseline memory   Insight poor   Judgement impaired   Eye Contact at examiner   Affect blunted, flat   Mood mood is calm   Physical Appearance/Attire neat   Hygiene well groomed   Suicidality other (see comments)  (denies )   1. Wish to be Dead (Recent) No   2. Non-Specific Active Suicidal Thoughts (Recent) No   Self Injury other (see comment)  (denies)   Activity other (see comment)  (visible in the milieu )   Speech clear;coherent   Activities of Daily Living   Hygiene/Grooming independent   Oral Hygiene independent   Dress independent   Laundry with supervision   Room Organization independent       Pt denied SI and SIB.  Pt ate supper.  Pt was visible in the milieu, but spent most of the shift in bed.  Pt reported feeling constipated,  nurse was notified.  Pt is independent with ADL's.

## 2020-04-26 NOTE — PROGRESS NOTES
Pt observed in room sleeping but talking to her self. Pt may be prescribed something to help her from carrying conversation. She did shower and ate both meals with no problem. She appears isolative and withdrawn to self. Calm and pleasant on approach.

## 2020-04-27 LAB
SPECIMEN SOURCE: ABNORMAL
T VAGINALIS DNA SPEC QL NAA+PROBE: ABNORMAL

## 2020-04-27 PROCEDURE — 99207 ZZC CONSULT E&M CHANGED TO INITIAL LEVEL: CPT | Performed by: PHYSICIAN ASSISTANT

## 2020-04-27 PROCEDURE — 12400001 ZZH R&B MH UMMC

## 2020-04-27 PROCEDURE — 87591 N.GONORRHOEAE DNA AMP PROB: CPT | Performed by: STUDENT IN AN ORGANIZED HEALTH CARE EDUCATION/TRAINING PROGRAM

## 2020-04-27 PROCEDURE — 99222 1ST HOSP IP/OBS MODERATE 55: CPT | Performed by: PHYSICIAN ASSISTANT

## 2020-04-27 PROCEDURE — 87661 TRICHOMONAS VAGINALIS AMPLIF: CPT | Performed by: STUDENT IN AN ORGANIZED HEALTH CARE EDUCATION/TRAINING PROGRAM

## 2020-04-27 PROCEDURE — 25000132 ZZH RX MED GY IP 250 OP 250 PS 637: Performed by: STUDENT IN AN ORGANIZED HEALTH CARE EDUCATION/TRAINING PROGRAM

## 2020-04-27 PROCEDURE — 87491 CHLMYD TRACH DNA AMP PROBE: CPT | Performed by: STUDENT IN AN ORGANIZED HEALTH CARE EDUCATION/TRAINING PROGRAM

## 2020-04-27 PROCEDURE — 99232 SBSQ HOSP IP/OBS MODERATE 35: CPT | Mod: GC | Performed by: PSYCHIATRY & NEUROLOGY

## 2020-04-27 RX ORDER — QUETIAPINE FUMARATE 200 MG/1
200 TABLET, FILM COATED ORAL AT BEDTIME
Status: DISCONTINUED | OUTPATIENT
Start: 2020-04-27 | End: 2020-05-02 | Stop reason: HOSPADM

## 2020-04-27 RX ORDER — BENZONATATE 100 MG/1
100 CAPSULE ORAL 3 TIMES DAILY PRN
Status: DISCONTINUED | OUTPATIENT
Start: 2020-04-27 | End: 2020-05-02 | Stop reason: HOSPADM

## 2020-04-27 RX ORDER — ALBUTEROL SULFATE 90 UG/1
2 AEROSOL, METERED RESPIRATORY (INHALATION) EVERY 6 HOURS PRN
Status: DISCONTINUED | OUTPATIENT
Start: 2020-04-27 | End: 2020-04-27 | Stop reason: RX

## 2020-04-27 RX ORDER — PRAZOSIN HYDROCHLORIDE 1 MG/1
1 CAPSULE ORAL AT BEDTIME
Status: DISCONTINUED | OUTPATIENT
Start: 2020-04-27 | End: 2020-05-01

## 2020-04-27 RX ORDER — GUAIFENESIN 600 MG/1
600 TABLET, EXTENDED RELEASE ORAL 2 TIMES DAILY
Status: DISCONTINUED | OUTPATIENT
Start: 2020-04-27 | End: 2020-05-02 | Stop reason: HOSPADM

## 2020-04-27 RX ORDER — LORATADINE 10 MG/1
10 TABLET ORAL DAILY
Status: DISCONTINUED | OUTPATIENT
Start: 2020-04-27 | End: 2020-05-02 | Stop reason: HOSPADM

## 2020-04-27 RX ORDER — POLYETHYLENE GLYCOL 3350 17 G/17G
17 POWDER, FOR SOLUTION ORAL DAILY PRN
Status: DISCONTINUED | OUTPATIENT
Start: 2020-04-27 | End: 2020-05-02 | Stop reason: HOSPADM

## 2020-04-27 RX ADMIN — MULTIPLE VITAMINS W/ MINERALS TAB 1 TABLET: TAB at 08:11

## 2020-04-27 RX ADMIN — GUAIFENESIN 600 MG: 600 TABLET, EXTENDED RELEASE ORAL at 10:52

## 2020-04-27 RX ADMIN — PRAZOSIN HYDROCHLORIDE 1 MG: 1 CAPSULE ORAL at 21:43

## 2020-04-27 RX ADMIN — FOLIC ACID 1 MG: 1 TABLET ORAL at 08:11

## 2020-04-27 RX ADMIN — GUAIFENESIN 600 MG: 600 TABLET, EXTENDED RELEASE ORAL at 21:43

## 2020-04-27 RX ADMIN — FERROUS SULFATE TAB 325 MG (65 MG ELEMENTAL FE) 325 MG: 325 (65 FE) TAB at 08:11

## 2020-04-27 RX ADMIN — LORATADINE 10 MG: 10 TABLET ORAL at 13:13

## 2020-04-27 RX ADMIN — THIAMINE HCL TAB 100 MG 100 MG: 100 TAB at 08:11

## 2020-04-27 RX ADMIN — QUETIAPINE FUMARATE 200 MG: 200 TABLET ORAL at 21:43

## 2020-04-27 ASSESSMENT — ACTIVITIES OF DAILY LIVING (ADL)
ORAL_HYGIENE: INDEPENDENT
ORAL_HYGIENE: INDEPENDENT
HYGIENE/GROOMING: INDEPENDENT
HYGIENE/GROOMING: INDEPENDENT
LAUNDRY: WITH SUPERVISION
DRESS: INDEPENDENT
DRESS: INDEPENDENT

## 2020-04-27 NOTE — PROGRESS NOTES
[facility]  Internal Medicine Consult     Iliana Barrera MRN# [id number]   Age: 50 year old YOB: 1970     Date of Admission: 4/23/2020  Date of Consult: 4/27/2020    Primary Care Provider: Bruce Dhaliwal    Requesting Service: Behavioral Health - Faustino Norton MD  Reason for Consult: General Medical Evaluation      SUBJECTIVE   CC:   Cough    Assessment and Plan/Recommendations:   Iliana Barrera is a 50 year old female with history of asthma, chronic back pain, EVON, alcohol abuse, HLD, gastric bypass and depression who was admitted to station [facility] with psychiatric decompensation. Medicine asked to evaluate patient due to cough x2 days    Depression, alcohol abuse with SI: Admitted with worsening depression. Drinking 36 beers daily plus 2 pints vodka daily PTA  - Management per psych     Cough: Present x2 days. Notes runny nose with PND which seems to cause cough. No fever, chills, sore throat. No dyspnea, chest tightness, wheezing or chest pain. No sick contacts. Reports h/o allergies. VSS on room air. Etiology viral URI with PND vs allergies with PND  - Supportive cares  - Please call medicine if patient develops fever >100.4, worsening cough, new dyspnea or hypoxia, chest pain, chest tightness or other symptoms of concern as these symptoms may require further workup     EVON: hgb stable. Continue ferrous sulfate    HLD: no PTA meds. Continue follow up with PCP    Chronic pain: No PTA meds, stable. Monitor     Abdominal discomfort: Present x1 day. No BM today and usually has daily BM. Was noncompliant with PTA senokot while on etoh binge. Denies N/V, hematemesis, hematochezia, melena, pain after eating. Some gas pains. Encourage compliance with bowel regimen. Contact medicine if no BM in next 24 hours or if symptoms persist    Asthma: denies s/s exacerbation. Continue prn albuterol. Contact medicine if increased use is required. Monitor       Currently, medically  stable and internal medicine will sign off. Please contact if future questions or concerns arise. Thank you for the opportunity to be a part of this patient's care.      Preethi Yuan PA-C  Internal Medicine [facility]ist  [phone]  April 27, 2020         HPI:   Iliana Barrera is a 50 year old female with history of asthma, chronic back pain, EVON, alcohol abuse, HLD, gastric bypass and depression who was admitted to station 20 with psychiatric decompensation. Medicine asked to evaluate patient due to cough x2 days    Patient reports cough with runny nose. Says that mucous drips down the back of her throat and causes a cough. No sinus pressure, headache, fever or chills. Denies chest tightness, wheezing, dyspnea, or chest pain. No sick contacts. Has allergies that usually flare this time of year, although often starts with a stuffy nose. Per d/w nursing, patient has not been seen coughing at all on the unit. Nursing attempted to make patient laugh, and he noted this also did not cause cough. Also reports abdominal discomfort today. Present x1 day. No BM today and usually has daily BM. Was noncompliant with PTA senokot while on etoh binge. Denies N/V, hematemesis, hematochezia, melena, pain after eating. Some gas pains.      Past Medical History:     Past Medical History:   Diagnosis Date     Allergic state      Anemia      Chronic back pain      Cystitis, acute     3.19.14     Uncomplicated asthma         Reviewed and updated in [facility].     Past Surgical History:      Past Surgical History:   Procedure Laterality Date     ABDOMEN SURGERY      gastric bypass     C SKIN TAGS      removal of multiple skin tags     GASTRIC BYPASS  2012     GYN SURGERY      tubal ligation     TUBAL LIGATION       uterine cysts removed           Social History:   Tobacco use: Denies  Alcohol use: Yes  Elicit drug use: denies      Family History:     Family History   Problem Relation Age of Onset     Hypertension Mother       "Coronary Artery Disease Mother      Depression Mother      Mental Illness Mother      Substance Abuse Mother      Alcoholism Mother      Substance Abuse Sister      Mental Illness Father      Alcoholism Son          Allergies:   No Known Allergies      Medications:   Reviewed. Please see MAR     Review of Systems:   10 point ROS of systems including Constitutional, Eyes, Respiratory, Cardiovascular, Gastroenterology, Genitourinary, Integumentary, Muscularskeletal, Psychiatric were all negative except for pertinent positives noted in my HPI.    OBJECTIVE   Physical Exam:   Vitals were reviewed  Blood pressure (!) 148/97, pulse 93, temperature 98.8  F (37.1  C), temperature source Oral, resp. rate 16, height 1.626 m (5' 4\"), weight 101 kg (222 lb 11.2 oz), SpO2 99 %, not currently breastfeeding.  General: Alert and oriented x3, appears comfortable  HEENT: Anicteric sclera, EOMI, membranes moist  Cardiovascular: RRR, S1S2. No murmur noted  Lungs: CTAB without wheezing or crackles. No coughing event during interview or exam  GI: Abdomen soft with bowel sounds present. Minimal mid-abdominal tenderness without guarding or rebound present  Vascular: No peripheral edema, distal pulses palpable  Neurologic: No focal deficits, CN II-XII grossly intact  Neuropsychiatric: Per psych  Skin: No jaundice, rashes, or lesions        Data:        Lab Results   Component Value Date     04/23/2020    Lab Results   Component Value Date    CHLORIDE 103 04/23/2020    Lab Results   Component Value Date    BUN 13 04/23/2020      Lab Results   Component Value Date    POTASSIUM 4.0 04/23/2020    Lab Results   Component Value Date    CO2 27 04/23/2020    Lab Results   Component Value Date    CR 0.44 04/23/2020        Lab Results   Component Value Date    WBC 7.8 04/23/2020    HGB 13.4 04/23/2020    HCT 39.9 04/23/2020    MCV 85 04/23/2020     04/23/2020     Lab Results   Component Value Date    WBC 7.8 04/23/2020            "

## 2020-04-27 NOTE — PROGRESS NOTES
Diagnosis/Procedure:   Patient Active Problem List   Diagnosis     Menorrhagia     Hx of gastric bypass     Hyperlipidemia LDL goal <160     BMI 37.0-37.9, adult     Intermittent asthma     Iron deficiency anemia     Other allergic rhinitis     Constipation, unspecified constipation type     S/P alcohol detoxification     MDD (major depressive disorder)     Opioid abuse (H)     Vitamin B 12 deficiency     Alcohol use disorder     Suicidal intent         Work Completed: Clinical team meeting     Discharge Plan or Goal: Unknown at this time.     Barriers to Discharge: Patient is still experiencing some psychotic symptoms including auditory hallucinations. The patient had some medication changes today.

## 2020-04-27 NOTE — PLAN OF CARE
NURSING ASSESSMENT  Problem: Suicidal Behavior  Goal: Suicidal Behavior is Absent or Managed  Outcome: No Change       MENTAL HEALTH  Pt denies SI/SIB.  Pt endorses AH that are bothersome at times. Pt reports sometimes it is command hallucinations, telling pt to hurt self, but there are none today.    Pt is calm and pleasant, with flat, blunted affect. Pt is slightly disheveled in appearance. Pt remains isolative and withdrawn in bedroom, listening to headphones. Pt reports headphones minimize AH.     MSSA = 4; Last dose of ativan 4/25/2020, MSSA discontinued today    Appetite= eating and tolerating meals    Sleep = 6.75hrs    ADLs = independent; pt did not shower at time of entry    Medication side effects = denies    MEDICAL CONCERNS  Pt complains of runny nose, phlegm in throat, and a cough. Pt also report abdominal pain. Team notified.    VITAL SIGNS     04/27/20 0850   Vital Signs   Temp 98.8  F (37.1  C)   Temp src Oral   Resp 16   Sitting Orthostatic BP   Sitting Orthostatic /89   Sitting Orthostatic Pulse 91 bpm   Standing Orthostatic BP   Standing Orthostatic /90   Standing Orthostatic Pulse 99 bpm     RECOMMENDATIONS  Continue with plan of care.

## 2020-04-27 NOTE — PROGRESS NOTES
"    ----------------------------------------------------------------------------------------------------------  [facility], [place]   Psychiatric Progress Note  Hospital Day #4     Interim History:   The patient's care was discussed with the treatment team and chart notes were reviewed.    Sleep: 6 hrs  Scheduled Medications: Taken all  Staff Report:   LASHAWN was trending down from 8 in morning to 5 at 3 pm yesterday with no s/o withdrawal. She has been mostly in her room. Noted to be talking to self. Also staff have noted she has been coughing. She has also asked for mucinex for a stuffy nose.     Patient was interviewed in the conference room via video-conference. She was okay with proceeding with meeting and had no immediate concerns. She says she couldn't come down from the alcohol this time. She says she felt like she was dreaming and still does a bit (a higher mental function test at this time is intact). She says she has been getting nightmares. We find she is not on her Prazosin from home. She says she hears voices mostly when alone, they are\" talking to me\" and \"comment about me\". She says they also scream and shout and get loud sometimes. She also sees figures - describes them as people in black capes, standing on one side, sometimes following her. She also says they get into bed with her. She reports she has not had suicidal ideas since admission. She agrees to tell staff if she feels thoughts worsen. She can't remember her home medications, tries to spell out Di...azepam as something she takes. She also asks for Prazosin, asthma inhalers and allergy meds. She shares a concern for unprotected sex and asks for STI testing.  She says she has craving for alcohol and is willing to consider taking medications for that if needed. We discuss plan to increase dose of Quetiapine and she is agreeable to plan. We also discuss Quetiapine to help with anxiety.  She reports 2 days of cough " "with sputum and no other symptoms. She agrees to wear a mask and also for IM consult.  The risks, benefits, alternatives and side effects of any medication changes have been discussed and are understood by the patient and other caregivers.    Review of systems:     ROS was negative unless noted above.          Allergies:   No Known Allergies         Psychiatric Examination:   BP (!) 148/97 (BP Location: Right arm)   Pulse 93   Temp 98.8  F (37.1  C) (Oral)   Resp 16   Ht 1.626 m (5' 4\")   Wt 101 kg (222 lb 11.2 oz)   SpO2 99%   BMI 38.23 kg/m    Weight is 222 lbs 11.2 oz  Body mass index is 38.23 kg/m .    Appearance:  awake, alert, dressed in hospital scrubs and appeared older than stated age  Attitude:  cooperative  Eye Contact:  fair  Mood:  anxious  Affect:  intensity is blunted  Speech:  decreased prosody  Psychomotor Behavior:  no evidence of tardive dyskinesia, dystonia, or tics  Thought Process:  linear  Associations:  no loose associations  Thought Content:  passive suicidal ideation present, auditory hallucinations present and visual hallucinations present  Insight:  fair  Judgment:  fair  Oriented to:  oriented to month and year, place and person. informed of date and day.   Attention Span and Concentration:  fair  Recent and Remote Memory:  intact  Language: Able to name objects and Able to repeat phrases  Fund of Knowledge: appropriate  Muscle Strength and Tone: normal  Gait and Station: Normal         Labs:   No results found for this or any previous visit (from the past 24 hour(s)).       Assessment    Diagnostic Impression:   This patient is a 50 year old female with history of schizophrenia, depression, anxiety, polysubstance use disorder(alcohol,opiods), and post traumatic stress disorder who presented to [facility] ED on 04/23/2020 due to suicidal ideation. This is in the context of feeling overwhelmed and unsafe, recent ejection from her detox facility, and alcohol intoxication. Significant " symptoms include suicdal thoughts with a plan to jump off a bridge, auditory hallucinations, visual hallucinations,  paranoid thoughts, cluster B traits of poor coping and difficulty with stable relationships. Substance use from alcohol contributes to patient's presentation. Family history is notable for depression and substance use disorder, hence genetic loading is present. MSE is notable for poorly groomed woman who was drowsy with slurred speech. She has endorses a sad mood,  flat affect with constricted mobility, tangential thought thought process with no suicidal ideations or hallucinations. Patient's is currently in alcohol withdrawal from intoxication. Her presentation is also suggestive of exacerbation of psychotic features from underlying diagnosis of schizophrenia vs substance induced psychosis. A differential to consider is an underlying schizoaffective disorder. The presence of cluster B traits also raises a high index of suspicion for an unspecified personality disorder.     Reason for inpatient hospitalization is to help patient through withdrawal phase, help her feel better overall, and to contract for safety. Disposition pending clinical stabilization, medication optimization, and formulation of safe discharge plan.    Hospital course: Iliana Barrera was admitted to station 20 as a voluntary patient. After admission she had elevated BPs but her MSSA scores trended down. She had reported a subjective difficult withdrawal and being in a dream like state but had intact higher mental functions and no other suggestion of delirium. She continued to have 2nd and 3rd person AH and Visual Hallucinations. She also continued to report anxiety and low mood. Suicidal ideation had reduced after admission. She reported nightmares and PTA Prazosin was restarted. Quetiapine dose increased for psychotic symptoms.      Discontinued Medications (& Rationale): PO Prazosin 1 mg at bedtime and PO Prozac 20 mg  dly    Medical course- Patient had a cough for 2 days duration and IM was consulted. No other respiratory or systemic symptoms.      Plan     Admit to Unit 20 with Attending Physician Errol Harmon MD.  Legal Status: Voluntary     Safety Assessment:   Risk factors for suicide include substance use, chronic mental illness, single status,homelessness,unemployment, and poor family dynamics. Protective factor include, patient's relationship with one of her daughters.     Acute risk:moderate  Chronic risk:moderate     Pt has not required locked seclusion or restraints in the past 24 hours to maintain safety, please refer to RN documentation for further details.     Precautions: Withdrawal, Suicide     Principal psychiatric diagnosis:     #Schizophrenia, multiple episodes, in acute episode  R/o schizoaffective disorder  # Alcohol use disorder, in uncomplicated withdrawal     Secondary psychiatric diagnoses:   # Post traumatic stress disorder  #Unspecified personality diosrder    Safety Assessment:   Behavioral Orders   Procedures     Code 1 - Restrict to Unit     Routine Programming     As clinically indicated     Self Injury Precaution     Status 15     Every 15 minutes.     Suicide precautions     Patients on Suicide Precautions should have a Combination Diet ordered that includes a Diet selection(s) AND a Behavioral Tray selection for Safe Tray - with utensils, or Safe Tray - NO utensils       Withdrawal precautions        Medications:   Changes today:  MSSA meds discontinued today keeping MSSA checks on for today as last BP recordings were high (148/97 possibly withdrawal related).   Increase Quetiapine to 200 mg at night  Mucinex 600 mg BID    Continue:    -IM/PO Olanzapine 10mg Q2H PRN aggression/agitation  -PO Hydroxyzine 25 mg Q4H PRN  -PO Zofran 4 mg Q6H PRN  -MSSA protocol with 1-4 mg Ativan  -PO Thiamine 100 mg dly  -PO Folic acid 1 mg dly        Patient will be treated in therapeutic milieu with appropriate  individual, group therapies, and medications as above     Medical diagnoses to be addressed:    Loratadine 10 mg daily for allergies  Albuterol inhaler/neb PRN for Asthma  Vit D def on supplement  h/o HLD - rpt lipids  h/o Gastric bypass  IM consult for cough      Consult: Nutrition consulted and appreciate recs- no current intervention.      Labs: CBC, CMP (ALT: 62, AST:116), Utox(pos for ethanol),HCG-neg, Vit B12, Folate, TSH-wnl. STI testing sent at patient request.     Dispo: unknown pending medication management and clinical stabilization     Patient was seen and discussed with the attending.    Dario Snowden MD  PGY 2 resident      Attestation:  I, Faustino Norton, saw and evaluated the patient with the resident physician.  I agree with the findings and plan of care as documented in the resident note.  I have reviewed all labs and vital signs.

## 2020-04-27 NOTE — PROGRESS NOTES
04/27/20 1432   General Information   Has Not Attended OT as of: 04/27/20     Phoebe Menjivar, OT on 4/27/2020 at 2:32 PM

## 2020-04-28 LAB
C TRACH DNA SPEC QL NAA+PROBE: NEGATIVE
CHOLEST SERPL-MCNC: 222 MG/DL
HDLC SERPL-MCNC: 61 MG/DL
LDLC SERPL CALC-MCNC: 136 MG/DL
N GONORRHOEA DNA SPEC QL NAA+PROBE: NEGATIVE
NONHDLC SERPL-MCNC: 161 MG/DL
SPECIMEN SOURCE: NORMAL
SPECIMEN SOURCE: NORMAL
T PALLIDUM AB SER QL: NONREACTIVE
TRIGL SERPL-MCNC: 125 MG/DL

## 2020-04-28 PROCEDURE — 99232 SBSQ HOSP IP/OBS MODERATE 35: CPT | Mod: GC | Performed by: PSYCHIATRY & NEUROLOGY

## 2020-04-28 PROCEDURE — 80061 LIPID PANEL: CPT | Performed by: STUDENT IN AN ORGANIZED HEALTH CARE EDUCATION/TRAINING PROGRAM

## 2020-04-28 PROCEDURE — 86780 TREPONEMA PALLIDUM: CPT | Performed by: STUDENT IN AN ORGANIZED HEALTH CARE EDUCATION/TRAINING PROGRAM

## 2020-04-28 PROCEDURE — 12400001 ZZH R&B MH UMMC

## 2020-04-28 PROCEDURE — 25000132 ZZH RX MED GY IP 250 OP 250 PS 637: Performed by: STUDENT IN AN ORGANIZED HEALTH CARE EDUCATION/TRAINING PROGRAM

## 2020-04-28 PROCEDURE — 36415 COLL VENOUS BLD VENIPUNCTURE: CPT | Performed by: STUDENT IN AN ORGANIZED HEALTH CARE EDUCATION/TRAINING PROGRAM

## 2020-04-28 RX ADMIN — FOLIC ACID 1 MG: 1 TABLET ORAL at 09:13

## 2020-04-28 RX ADMIN — THIAMINE HCL TAB 100 MG 100 MG: 100 TAB at 09:13

## 2020-04-28 RX ADMIN — MULTIPLE VITAMINS W/ MINERALS TAB 1 TABLET: TAB at 09:13

## 2020-04-28 RX ADMIN — LORATADINE 10 MG: 10 TABLET ORAL at 09:13

## 2020-04-28 RX ADMIN — QUETIAPINE FUMARATE 200 MG: 200 TABLET ORAL at 21:27

## 2020-04-28 RX ADMIN — POLYETHYLENE GLYCOL 3350 17 G: 17 POWDER, FOR SOLUTION ORAL at 13:04

## 2020-04-28 RX ADMIN — PRAZOSIN HYDROCHLORIDE 1 MG: 1 CAPSULE ORAL at 21:26

## 2020-04-28 RX ADMIN — SENNOSIDES AND DOCUSATE SODIUM 1 TABLET: 8.6; 5 TABLET ORAL at 21:27

## 2020-04-28 RX ADMIN — GUAIFENESIN 600 MG: 600 TABLET, EXTENDED RELEASE ORAL at 09:13

## 2020-04-28 RX ADMIN — FERROUS SULFATE TAB 325 MG (65 MG ELEMENTAL FE) 325 MG: 325 (65 FE) TAB at 09:13

## 2020-04-28 RX ADMIN — GUAIFENESIN 600 MG: 600 TABLET, EXTENDED RELEASE ORAL at 21:26

## 2020-04-28 ASSESSMENT — ACTIVITIES OF DAILY LIVING (ADL)
ORAL_HYGIENE: INDEPENDENT
HYGIENE/GROOMING: INDEPENDENT
DRESS: INDEPENDENT
ORAL_HYGIENE: INDEPENDENT
LAUNDRY: WITH SUPERVISION
DRESS: INDEPENDENT
HYGIENE/GROOMING: INDEPENDENT

## 2020-04-28 NOTE — PROGRESS NOTES
4/28/2020    CD consult acknowledged. Writer received CD paperwork from HUMBERTO Monzon. Pt will have assessment completed tomorrow 4/29.    YUNIEL Ascencio

## 2020-04-28 NOTE — PROGRESS NOTES
Pt has been in her room most of the shift. Pt denies SI/SIB. Pt doesn't show any interest in going to groups or even sitting in the lounge.        04/28/20 1453   Behavioral Health   Hallucinations denies / not responding to hallucinations   Thinking poor concentration   Orientation person: oriented;place: oriented;date: oriented;time: oriented   Memory baseline memory   Insight insight appropriate to events   Judgement impaired   Eye Contact at examiner   Affect full range affect   Mood mood is calm   Physical Appearance/Attire appears stated age;attire appropriate to age and situation;neat   Hygiene well groomed   Suicidality   (denies)   1. Wish to be Dead (Recent) No   2. Non-Specific Active Suicidal Thoughts (Recent) No   Self Injury   (denies)   Activity isolative;withdrawn   Speech clear;coherent   Psychomotor / Gait balanced;steady   Activities of Daily Living   Hygiene/Grooming independent   Oral Hygiene independent   Dress independent   Room Organization independent

## 2020-04-28 NOTE — PROGRESS NOTES
Pt had a slight  non- productive cough this shift.She denied any feelings of not feeling well. Will continue to monitor.

## 2020-04-28 NOTE — PROGRESS NOTES
The patient signed SANDRA for , [facility]a [facility]. The SANDRA has been placed in the patient's chart.     Writer received a call from the patient's outpatient , Taina Epstein with [facility]. Taina stated that she has been working to try and get the patient into CD treatment with [facility] or [facility]. Writer informed her that the patient will have a CD assessment done here and writer will keep her updated about what facility she will be sent to. Writer provided the unit number for Taina to reach the patient on the unit.
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
## 2020-04-28 NOTE — PROGRESS NOTES
Diagnosis/Procedure:   Patient Active Problem List   Diagnosis     Menorrhagia     Hx of gastric bypass     Hyperlipidemia LDL goal <160     BMI 37.0-37.9, adult     Intermittent asthma     Iron deficiency anemia     Other allergic rhinitis     Constipation, unspecified constipation type     S/P alcohol detoxification     MDD (major depressive disorder)     Opioid abuse (H)     Vitamin B 12 deficiency     Alcohol use disorder     Suicidal intent         Work Completed: Clinical team meeting, coordination with patient for CD treatment, coordination with CD  for patient assessment, coordination with the patient's outpatient .     Discharge Plan or Goal: The patient will get a CD assessment this week and will be referred to CD treatment. The patient will go from the hospital to treatment.     Barriers to Discharge: The patient will get an assessment for CD treatment. One possible barrier could be securing funding for treatment or no current openings at the patient's chosen facility.

## 2020-04-29 ENCOUNTER — HOSPITAL ENCOUNTER (OUTPATIENT)
Dept: BEHAVIORAL HEALTH | Facility: CLINIC | Age: 50
Discharge: HOME | End: 2020-04-29
Attending: FAMILY MEDICINE | Admitting: FAMILY MEDICINE
Payer: COMMERCIAL

## 2020-04-29 PROCEDURE — 25000132 ZZH RX MED GY IP 250 OP 250 PS 637: Performed by: STUDENT IN AN ORGANIZED HEALTH CARE EDUCATION/TRAINING PROGRAM

## 2020-04-29 PROCEDURE — H0001 ALCOHOL AND/OR DRUG ASSESS: HCPCS | Mod: HF,TEL | Performed by: COUNSELOR

## 2020-04-29 PROCEDURE — 99232 SBSQ HOSP IP/OBS MODERATE 35: CPT | Mod: GC | Performed by: PSYCHIATRY & NEUROLOGY

## 2020-04-29 PROCEDURE — 12400001 ZZH R&B MH UMMC

## 2020-04-29 PROCEDURE — 93005 ELECTROCARDIOGRAM TRACING: CPT | Mod: TEL

## 2020-04-29 RX ORDER — METRONIDAZOLE 500 MG/1
500 TABLET ORAL 2 TIMES DAILY
Status: DISCONTINUED | OUTPATIENT
Start: 2020-04-29 | End: 2020-05-02 | Stop reason: HOSPADM

## 2020-04-29 RX ORDER — CITALOPRAM HYDROBROMIDE 20 MG/1
20 TABLET ORAL DAILY
Status: DISCONTINUED | OUTPATIENT
Start: 2020-04-29 | End: 2020-05-02 | Stop reason: HOSPADM

## 2020-04-29 RX ORDER — LACTULOSE 10 G/15ML
20 SOLUTION ORAL 2 TIMES DAILY
Status: DISCONTINUED | OUTPATIENT
Start: 2020-04-29 | End: 2020-05-02 | Stop reason: HOSPADM

## 2020-04-29 RX ADMIN — PRAZOSIN HYDROCHLORIDE 1 MG: 1 CAPSULE ORAL at 21:00

## 2020-04-29 RX ADMIN — LACTULOSE 20 G: 10 SOLUTION ORAL at 11:11

## 2020-04-29 RX ADMIN — GUAIFENESIN 600 MG: 600 TABLET, EXTENDED RELEASE ORAL at 08:57

## 2020-04-29 RX ADMIN — METRONIDAZOLE 500 MG: 500 TABLET ORAL at 13:01

## 2020-04-29 RX ADMIN — LACTULOSE 20 G: 10 SOLUTION ORAL at 21:00

## 2020-04-29 RX ADMIN — CITALOPRAM HYDROBROMIDE 20 MG: 20 TABLET ORAL at 10:44

## 2020-04-29 RX ADMIN — METRONIDAZOLE 500 MG: 500 TABLET ORAL at 20:59

## 2020-04-29 RX ADMIN — QUETIAPINE FUMARATE 200 MG: 200 TABLET ORAL at 21:00

## 2020-04-29 RX ADMIN — GUAIFENESIN 600 MG: 600 TABLET, EXTENDED RELEASE ORAL at 21:00

## 2020-04-29 RX ADMIN — THIAMINE HCL TAB 100 MG 100 MG: 100 TAB at 08:57

## 2020-04-29 RX ADMIN — MULTIPLE VITAMINS W/ MINERALS TAB 1 TABLET: TAB at 08:57

## 2020-04-29 RX ADMIN — POLYETHYLENE GLYCOL 3350 17 G: 17 POWDER, FOR SOLUTION ORAL at 08:57

## 2020-04-29 RX ADMIN — FOLIC ACID 1 MG: 1 TABLET ORAL at 08:57

## 2020-04-29 RX ADMIN — FERROUS SULFATE TAB 325 MG (65 MG ELEMENTAL FE) 325 MG: 325 (65 FE) TAB at 08:57

## 2020-04-29 RX ADMIN — SENNOSIDES AND DOCUSATE SODIUM 1 TABLET: 8.6; 5 TABLET ORAL at 21:00

## 2020-04-29 RX ADMIN — LORATADINE 10 MG: 10 TABLET ORAL at 08:57

## 2020-04-29 ASSESSMENT — ACTIVITIES OF DAILY LIVING (ADL)
DRESS: SCRUBS (BEHAVIORAL HEALTH);INDEPENDENT
HYGIENE/GROOMING: INDEPENDENT
LAUNDRY: WITH SUPERVISION
ORAL_HYGIENE: INDEPENDENT
LAUNDRY: UNABLE TO COMPLETE
HYGIENE/GROOMING: INDEPENDENT
ORAL_HYGIENE: INDEPENDENT
DRESS: INDEPENDENT

## 2020-04-29 NOTE — PLAN OF CARE
"Pt was isolative these evening in her room listening to headphones. Pt came out for supper and medication. Pt presented with blunted and flat affect. Endorses depression \"10\". Denied anxiety. Denied SI/SIB/VH. Pt endorses auditory hallucinations.   "

## 2020-04-29 NOTE — PROGRESS NOTES
"    ----------------------------------------------------------------------------------------------------------  [facility], [place]   Psychiatric Progress Note  Hospital Day #6     Interim History:   The patient's care was discussed with the treatment team and chart notes were reviewed.    Sleep:5.75 hours (04/29/20 0606)    Scheduled Medications:Taken all  Staff Report: Pt was isolative these evening in her room listening to headphones. Pt came out for supper and medication. Pt presented with blunted and flat affect. Endorses depression \"10\". Denied anxiety. Denied SI/SIB/VH. Pt endorses auditory hallucinations.  Continues to complain of constipation.         Patient Interview:  Patient was interviewed via zoom in the conference room on station 20. She continues to have ongoing constipation, she requests to try a stool softener orally. She expresses concern today about her Urine test which she says she was informed it was abnormal, she still has some yellowish vaginal discharge, which is foul smelling.  She says her mood is 'normal', but on further questioning admits that she still has some down periods, which last for a significant amount of time during the day. The suicidal thoughts are still present, though she does not have any plan to act on them. She says she would be willing to try an antidepressant, she has been on Citalopram in the past and found it effective.  She is to have her Rule 25 assessment today, she is looking forward to this. She says she is excited about going to rehab treatment, as she feels she related well to a lot of people there and her psychosis wasn't as bad when she was in treatment.        The risks, benefits, alternatives and side effects of any medication changes have been discussed and are understood by the patient and other caregivers.    Review of systems:     ROS was negative unless noted above.          Allergies:   No Known Allergies         " "Psychiatric Examination:   /89   Pulse 83   Temp 97.1  F (36.2  C) (Temporal)   Resp 16   Ht 1.626 m (5' 4\")   Wt 101 kg (222 lb 11.2 oz)   SpO2 100%   BMI 38.23 kg/m    Weight is 222 lbs 11.2 oz  Body mass index is 38.23 kg/m .    Appearance:  Awake and alert, dressed in hospital scrubs  Attitude:  cooperative  Eye Contact:  good  Mood:  'normal'  Affect:  intensity is blunted  Speech:  normal prosody  Psychomotor Behavior:  no evidence of tardive dyskinesia, dystonia, or tics  Thought Process:  linear  Associations:  no loose associations  Thought Content:  passive suicidal ideation present, auditory hallucinations present and visual hallucinations present  Insight:  fair  Judgment:  fair  Oriented to:  time, person, and place  Attention Span and Concentration:  fair  Recent and Remote Memory:  intact  Language: English with normal syntax  Fund of Knowledge: appropriate  Muscle Strength and Tone: normal  Gait and Station: Normal              Labs:   No results found for this or any previous visit (from the past 24 hour(s)).       Assessment    Diagnostic Impression:   This patient is a 50 year old female with history of schizophrenia, depression, anxiety, polysubstance use disorder(alcohol,opiods), and post traumatic stress disorder who presented to [facility] ED on 04/23/2020 due to suicidal ideation. This is in the context of feeling overwhelmed and unsafe, recent ejection from her detox facility, and alcohol intoxication. Significant symptoms include suicdal thoughts with a plan to jump off a bridge, auditory hallucinations, visual hallucinations,  paranoid thoughts, cluster B traits of poor coping and difficulty with stable relationships. Substance use from alcohol contributes to patient's presentation. Family history is notable for depression and substance use disorder, hence genetic loading is present. MSE is notable for poorly groomed woman who was drowsy with slurred speech. She has endorses a sad " mood,  flat affect with constricted mobility, tangential thought thought process with no suicidal ideations or hallucinations. Patient's is currently in alcohol withdrawal from intoxication. Her presentation is also suggestive of exacerbation of psychotic features from underlying diagnosis of schizophrenia vs substance induced psychosis. A differential to consider is an underlying schizoaffective disorder. The presence of cluster B traits also raises a high index of suspicion for an unspecified personality disorder.     Reason for inpatient hospitalization is to help patient through withdrawal phase, help her feel better overall, and to contract for safety. Disposition pending clinical stabilization, medication optimization, and formulation of safe discharge plan    Hospital course:   Iliana Barrera was admitted to station 20 as a voluntary patient. After admission she had elevated BPs but her MSSA scores trended down. She had reported a subjective difficult withdrawal and being in a dream like state but had intact higher mental functions and no other suggestion of delirium. She continued to have 2nd and 3rd person AH and Visual Hallucinations. She also continued to report anxiety and low mood. Suicidal ideation had reduced after admission. She reported nightmares and PTA Prazosin was restarted. Quetiapine dose increased for psychotic symptoms. RULE 25 for CD treatment was planned.  On 4/29/2020 she was commenced on Citalopram to address her depressive symptoms    Discontinued Medications (& Rationale):  PO Prazosin 1 mg at bedtime and PO Prozac 20 mg dly    Medical course Patient had a cough for 2 days duration and IM was consulted. No other respiratory or systemic symptoms.      Plan   Principal psychiatric diagnosis:      #Schizophrenia, multiple episodes, in acute episode  R/o schizoaffective disorder  # Alcohol use disorder, in uncomplicated withdrawal     Secondary psychiatric diagnoses:   # Post traumatic  stress disorder  #Unspecified personality disorder    Today's Changes:  Commence Citalopram 20 mg daily  Commence Lactulose 30 mls bid  Commence Metronidazole 500mg bid x 7 days ( as recommended by Medicine for Trichomonal infection)     Psychotropic Medications:  Modify:  Commence Citalopram 20 mg     Continue:  -IM/PO Olanzapine 10mg Q2H PRN aggression/agitation  -PO Hydroxyzine 25 mg Q4H PRN  -PO Zofran 4 mg Q6H PRN  -MSSA protocol with 1-4 mg Ativan  -PO Thiamine 100 mg dly  -PO Folic acid 1 mg dly  Quetiapine to 200 mg at night  Mucinex 600 mg BID    Patient will be treated in therapeutic milieu with appropriate individual and group therapies as described.      Medical diagnoses to be addressed this admission:    #Trichomonas infection:  -Metronidazole 500mg bid x 7 days      Loratadine 10 mg daily for allergies  Albuterol inhaler/neb PRN for Asthma  Vit D def on supplement  h/o HLD - rpt lipids with dyslipidemia  h/o Gastric bypass  IM consult for cough and IM recs, appreciate recs -- no work up will monitor for now     Data: CBC, CMP (ALT: 62, AST:116), Utox(pos for ethanol),HCG-neg, Vit B12, Folate, TSH-wnl. Lipids elevated. STI testing sent at patient request , Positive for Trichomonas    Consults: Nutrition consulted and appreciate recs- no current intervention    Legal Status: Voluntary    Safety Assessment:   Behavioral Orders   Procedures     Code 1 - Restrict to Unit     Routine Programming     As clinically indicated     Self Injury Precaution     Status 15     Every 15 minutes.     Suicide precautions     Patients on Suicide Precautions should have a Combination Diet ordered that includes a Diet selection(s) AND a Behavioral Tray selection for Safe Tray - with utensils, or Safe Tray - NO utensils         Disposition: unknown pending medication management and clinical stabilization     Patient was seen with  and plan discussed with the attending    Sahara Ferguson, PGY 1  Attestation:  Faustino WELLS,  saw and evaluated the patient with the resident physician.  I agree with the findings and plan of care as documented in the resident note.  I have reviewed all labs and vital signs.

## 2020-04-29 NOTE — PROGRESS NOTES
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 February\" 2020. She was in 2 treatment centers at this time.    1B. What were the circumstances of your most recent relapse with mood altering chemicals?    \"I can't sleep on the street with a clear head. It is the only way I can sleep.\"    2. Have you experienced cravings? If yes, ask follow up questions to determine if the person recognizes triggers and if the person has had any success in dealing with them.     In the past 30 days, on a scale of 0-10 (0 least severe to 10 being most severe, how would you rate your cravings?  10    3. Have you been treated for alcohol/other drug abuse/dependence? Yes.    3B. Number of times(lifetime) (over what period) 4-5.    3C. Number of times completed treatment (lifetime) 1.    3D. During the past three years have you participated in outpatient and/or residential?  Yes.    3E. When and where?   [facility] (completed) December 2019  [facility] for IP with lodging, but she was kicked out in January or February 2020.  [facility]  3F. What was helpful? What was not? \"I liked the dual treatment where they deal with your mental issues as well as your substance use. How they explained it to you and gave you coping skills.\"    4. Support group participation: Have " "you/do you attend support group meetings to reduce/stop your alcohol/drug use? How recently? What was your experience? Are you willing to restart? If the person has not participated, is he or she willing?     She reports attending AA meetings. \"When I was in [facility], I couldn't find any AA meetings to go to. The total, I went to 4 so far.\"    5. What would assist you in staying sober/straight?     \"I think I need some coping skills, a program with AA meeting that I like, a sponsor, and a safe place to come to, a home.\"    Dimension V Ratings   Relapse/Continued Use/Continued problem potential - The placing authority must use the criteria in Dimension V to determine a client s relapse, continued use, and continued problem potential.   RISK DESCRIPTIONS - Severity ratin No awareness of the negative impact of mental health problems or substance abuse. No coping skills to arrest mental health or addiction illnesses, or prevent relapse.    REASONS SEVERITY WAS ASSIGNED - (What information did the person provide that indicates his or her understanding of relapse issues? What about the person s experience indicates how prone he or she is to relapse? What coping skills does the person have that decrease relapse potential?)      Patient has attended 4 CD treatments, completing 1 of them. The last treatment she was in was in January or 2020.  Pt has only attended AA meetings while in a treatment program.  Pt reported having her longest period of sobriety \"I would have to say just recently. From December to February\" . She was in 2 treatment centers at this time.  Pt stated she relapsed because \"I can't sleep on the street with a clear head. It is the only way I can sleep.\" In the past 30 days, pt rates her cravings for alcohol as a 10 on the 0-10 Craving Scale. Pt lacks impulse control along with sober coping skills. Pt lacks insight into the effects her use has had on her physical and mental health. Pt is at " "a high risk for continued use.       DIMENSION VI - Recovery Environment   1. Are you employed/attending school? Tell me about that.     She last worked last year.    2A. Describe a typical day; evening for you. Work, school, social, leisure, volunteer, spiritual practices. Include time spent obtaining, using, recovering from drugs or alcohol. (DSM)     \"waking up with a drink in my hand and going to sleep with a drink in my hand.\"    Please describe what leisure activities have been associated with your substance abuse:     \"everything was done under the influence of alcohol. Half the time I don't remember because I was blacking out.\"    2B. How often do you spend more time than you planned using or use more than you planned? (DSM)     \"all the time. I  one can of beer and then I turn around and see the whole garbage can full of beer.\"    3. How important is using to your social connections? Do many of your family or friends use?     \"I don't have no friends, I am a loner.\"    4A. Are you currently in a significant relationship?     No    4C. Sexual Orientation:     Heterosexual    5A. Who do you live with?      homeless    5B. Tell me about their alcohol/drug use and mental health issues.     With others who drink    5C. Are you concerned for your safety there? Yes    5D. Are you concerned about the safety of anyone else who lives with you? No    6A. Do you have children who live with you?     No    6B. Do you have children who do not live with you?     Yes.  6 adult kids    7A. Who supports you in making changes in your alcohol or drug use? What are they willing to do to support you? Who is upset or angry about you making changes in your alcohol or drug use? How big a problem is this for you?      \"no one\"    7B. This table is provided to record information about the person s relationships and available support It is not necessary to ask each item; only to get a comprehensive picture of their support " "system.  How often can you count on the following people when you need someone?   Partner / Spouse Never supportive   Parent(s)/Aunt(s)/Uncle(s)/Grandparents Never supportive   Sibling(s)/Cousin(s) Never supportive   Child(tomi) Never supportive   Other relative(s) Never supportive   Friend(s)/neighbor(s) Never supportive   Child(tomi) s father(s)/mother(s) Never supportive   Support group member(s) The patient denied having any current involvement with 12-step or other support group meetings.   Community of rita members The patient denied having any current involvement with community rita members.   /counselor/therapist/healer The patient denied having any current involvement with a , counselor, therapist or healer.   Other (specify) No     8A. What is your current living situation?     She is homeless.    8B. What is your long term plan for where you will be living?     \"when I get out of treatment I plan on going to [place] because I have a daughter there who is going to hook me up with a job.\"    8C. Tell me about your living environment/neighborhood? Ask enough follow up questions to determine safety, criminal activity, availability of alcohol and drugs, supportive or antagonistic to the person making changes.      She is homeless. She either is couch hopping or sleeping on the streets with others who are in similar situations as her.    9. Criminal justice history: Gather current/recent history and any significant history related to substance use--Arrests? Convictions? Circumstances? Alcohol or drug involvement? Sentences? Still on probation or parole? Expectations of the court? Current court order? Any sex offenses - lifetime? What level? (DSM)    None    10. What obstacles exist to participating in treatment? (Time off work, childcare, funding, transportation, pending USP time, living situation)     The patient denied having any obstacles for participating in substance abuse " treatment.    Dimension VI Ratings   Recovery environment - The placing authority must use the criteria in Dimension VI to determine a client s recovery environment.   RISK DESCRIPTIONS - Severity ratin Client has (A) Chronically antagonistic significant other, living environment, family, peer group or long-term criminal justice involvement that is harmful to recovery or treatment progress, or (B) Client has an actively antagonistic significant other, family, work, or living environment with immediate threat to the client's safety and well-being.    REASONS SEVERITY WAS ASSIGNED - (What support does the person have for making changes? What structure/stability does the person have in his or her daily life that will increase the likelihood that changes can be sustained? What problems exist in the person s environment that will jeopardize getting/staying clean and sober?)     The patient was couch hopping prior to being hospitalized. She reports where she was staying there was a lot of drug and alcohol use. The patient's drinking has negatively impacted her relationships with her family and they no longer want to be around her as a result. The patient identified as being heterosexual and she denied being in a romantic relationship at this time. The patient denied having a history of legal issues. The patient is unemployed and she last worked was in 2019. The patient denied having any increased financial stress. The patient lacks a current sober peer support network.         Client Choice/Exceptions   Would you like services specific to language, age, gender, culture, Christian preference, race, ethnicity, sexual orientation or disability?  No    What particular treatment choices and options would you like to have? [facility] IP    Do you have a preference for a particular treatment program? None    Criteria for Diagnosis     Criteria for Diagnosis  DSM-5 Criteria for Substance Use Disorder  Instructions: Determine  whether the client currently meets the criteria for Substance Use Disorder using the diagnostic criteria in the DSM-V pp.[phone]. Current means during the most recent 12 months outside a facility that controls access to substances    Category of Substance Severity (ICD-10 Code / DSM 5 Code)     Alcohol Use Disorder Severe  (10.20) (303.90)   Cannabis Use Disorder The patient does not meet the criteria for a Cannabis use disorder.   Hallucinogen Use Disorder The patient does not meet the criteria for a Hallucinogen use disorder.   Inhalant Use Disorder The patient does not meet the criteria for an Inhalant use disorder.   Opioid Use Disorder Moderate (F11.20) (304.00)   Sedative, Hypnotic, or Anxiolytic Use Disorder The patient does not meet the criteria for a Sedative/Hypnotic use disorder.   Stimulant Related Disorder The patient does not meet the criteria for a Stimulant use disorder.   Tobacco Use Disorder The patient does not meet the criteria for a Tobacco use disorder.   Other (or unknown) Substance Use Disorder The patient does not meet the criteria for a Other (or unknown) Substance use disorder.       Collateral Contact Summary   Number of contacts made: 1    Contact with referring person:  No    If court related records were reviewed, summarize here: No court records had been reviewed at the time of this documentation.    Information from collateral contacts supported/largely agreed with information from the client and associated risk ratings.      Rule 25 Assessment Summary and Plan   's Recommendation    1)  Complete a [facility] residential based or similar treatment program.  2)  Abstain from all mood-altering chemicals unless prescribed by a licensed provider.   3)  Attend, at minimum, 2 weekly support group meetings, such as 12 step based (AA/NA), SMART Recovery, Health Realizations, and/or Refuge Recovery meetings.     4)  Actively work with a female mentor/sponsor on a weekly basis.   5)  Follow  "all the recommendations of your treatment/medical providers.      Collateral Contacts     Name:    [facility]   Relationship:    EM[facility]   Phone Number:    NA Releases:    NA     The patient's medical record at [facility] was reviewed and the information contained in the medical record supported the patient's account of her chemical use history and chemical use consequences.      Per Dr. Bibiana Nguyen  On 4/23/2020:  [facility] EMERGENCY DEPARTMENT ([facility])    4/23/20         History           Chief Complaint   Patient presents with     Addiction Problem       etoh: drinks about bottle a day: no hx seizure     Suicidal       done wiht life     HPI  Iliana Barrera is a 50 year old female with a past medical history pertinent for polysubstance abuse, anemia, hypercholesterolemia, uncomplicated asthma, and s/p gastric bypass who presents to the Emergency Department for substance abuse.  History is somewhat difficult to obtain secondary to the patient's current state.  She reports that she asked a friend to drive her here today.  She has a history of opiate abuse as well as alcohol abuse.  She claims to be drinking 2 18 packs of beer daily in addition to 2 pints of vodka daily.  She has been doing this daily for an undetermined amount of time \"ever since I was kicked out of rehab \".  Patient was apparently in a CD rehab treatment facility and was asked to leave earlier this month \"because some chic stole my food \".  She has been drinking daily since this time.  She is unable to identify exactly when she left rehab.  She admits to using oxycodone and Percocet \"whenever I am in the mood \".  She is unable to quantify how much she is using or how often.  She denies IV drug use.  She denies any history of withdrawal seizures or DTs.     Record review shows a history of depression.  The patient herself says she has \"OCD, PTSD, schizophrenia, multiple personality disorder \".  She claims that she hears " "voices all the time.  She is resistant to telling me what the voices are saying to her but states this is been going on her whole life.  She states that she will talk out loud to the voices and that people around her tell her to \"shut the f--- up \".  When asked about any suicidal ideation, her answer is \"the river is free \".  When asked to clarify this she states that she could easily jump off the bridge and she would drown because she does not know how to swim.  She reports this is her current plan.  It is unclear if she is had any prior suicide attempts.  When specifically asked if she would contract for safety if we are able to procure a detox bed for her, she immediately agrees to this.     She is not taking any of her medications.     She has a prior history of asthma as well as gastric bypass.      Assessments & Plan (with Medical Decision Making)   Patient presents for the above complaints.  On my evaluation she is lying back in a chair, significantly intoxicated.  Her conversation is very tangential and she is quite labile, frequently tearful.  She is repeatedly stating that she is interested in detox.  Breathalyzer here in the emergency department is 0.289.     Urine tox is negative for everything with the exception of alcohol.  I have ordered an oral rally pack for her.  CBC is within normal limits, CMP demonstrates normal electrolytes, creatinine of 0.44, elevated ALT and AST likely consistent with an alcoholic hepatitis.     Discussed with intake the possibility of admitting her to detox.  After the detox staff reviewed the chart, and after discussion with detox provider tonight, the decision was made that detox could not address her mental health issues and they would advise mental health admission.  We did have the [facility]  see the patient - please see her note for full details.  Plan to admit to mental health at this time. She is currently voluntary but holdable given SI with plan.    ollateral " Contacts      A problematic pattern of alcohol/drug use leading to clinically significant impairment or distress, as manifested by at least two of the following, occurring within a 12-month period:    1.) Alcohol/drug is often taken in larger amounts or over a longer period than was intended.  2.) There is a persistent desire or unsuccessful efforts to cut down or control alcohol/drug use  3.) A great deal of time is spent in activities necessary to obtain alcohol, use alcohol, or recover from its effects.  4.) Craving, or a strong desire or urge to use alcohol/drug  5.) Recurrent alcohol/drug use resulting in a failure to fulfill major role obligations at work, school or home.  6.) Continued alcohol use despite having persistent or recurrent social or interpersonal problems caused or exacerbated by the effects of alcohol/drug.  7.) Important social, occupational, or recreational activities are given up or reduced because of alcohol/drug use.  8.) Recurrent alcohol/drug use in situations in which it is physically hazardous.  9.) Alcohol/drug use is continued despite knowledge of having a persistent or recurrent physical or psychological problem that is likely to have been caused or exacerbated by alcohol.  10.) Tolerance, as defined by either of the following: A need for markedly increased amounts of alcohol/drug to achieve intoxication or desired effect.  11.) Withdrawal, as manifested by either of the following: The characteristic withdrawal syndrome for alcohol/drug (refer to Criteria A and B of the criteria set for alcohol/drug withdrawal).      Specify if: In early remission:  After full criteria for alcohol/drug use disorder were previously met, none of the criteria for alcohol/drug use disorder have been met for at least 3 months but for less than 12 months (with the exception that Criterion A4,  Craving or a strong desire or urge to use alcohol/drug  may be met).     In sustained remission:   After full  criteria for alcohol use disorder were previously met, non of the criteria for alcohol/drug use disorder have been met at any time during a period of 12 months or longer (with the exception that Criterion A4,  Craving or strong desire or urge to use alcohol/drug  may be met).   Specify if:   This additional specifier is used if the individual is in an environment where access to alcohol is restricted.    Mild: Presence of 2-3 symptoms  Moderate: Presence of 4-5 symptoms  Severe: Presence of 6 or more symptoms

## 2020-04-29 NOTE — PROGRESS NOTES
04/29/20 1455   Behavioral Health   Hallucinations auditory   Thinking intact   Orientation person: oriented;place: oriented;date: oriented;time: oriented   Memory baseline memory   Insight admits / accepts   Judgement impaired   Eye Contact at examiner   Affect full range affect;blunted, flat   Mood mood is calm   Physical Appearance/Attire attire appropriate to age and situation   Hygiene well groomed   Suicidality   (non observed )   1. Wish to be Dead (Recent) No   2. Non-Specific Active Suicidal Thoughts (Recent) No   Self Injury   (denies )   Elopement   (non obserevd )   Activity withdrawn;isolative   Speech clear;coherent   Medication Sensitivity no stated side effects;no observed side effects   Psychomotor / Gait balanced;steady   Psycho Education   Type of Intervention 1:1 intervention   Response participates, initiates socially appropriate   Hours 0.5   Treatment Detail check in    Safety   Suicidality Status 15   Activities of Daily Living   Hygiene/Grooming independent   Oral Hygiene independent   Dress independent   Laundry unable to complete   Room Organization independent     Pt stayed in her room throughout the shift. Pt only came out to eat her meals which she did in the dining room. Pt showered and listened to music through headphones. Pt stated she was hearing voices and has been writing down everything they've been saying. Pt stated she was not ready to show staff the notes she's been taking.Pt stated her mood is good today. Pt appeared to be withdrawn and isolative from peers. Pt denies having any thoughts of SI, SIB and or hallucinations.

## 2020-04-29 NOTE — PROGRESS NOTES
"    ----------------------------------------------------------------------------------------------------------  [facility], [place]   Psychiatric Progress Note  Hospital Day #6     Interim History:   The patient's care was discussed with the treatment team and chart notes were reviewed.    Sleep:5.75 hours (04/29/20 0606)  Scheduled Medications: Taken all  Staff Report: Pt was isolative these evening in her room listening to headphones. Pt came out for supper and medication. Pt presented with blunted and flat affect. Endorses depression \"10\". Denied anxiety. Denied SI/SIB/VH. Pt endorses auditory hallucinations.  Continues to complain of constipation.    Patient Interview:  patient was interviewed via zoom in the conference room on station 20. She continues to have ongoing constipation, she requests to try a stool softener orally. She expresses concern today about her Urine test which she says she was informed it was abnormal, she still has some yellowish vaginal discharge, which is foul smelling.  She says her mood is 'normal', but on further questioning admits that she still has some down periods, which last for a significant amount of time during the day. The suicidal thoughts are still present, though she does not have any plan to act on them. She says she would be willing to try an antidepressant, she has been on Citalopram in the past and found it effective.  She is to have her Rule 25 assessment today, she is looking forward to this. She says she is excited about going to rehab treatment, as she feels she related well to a lot of people there and her psychosis wasn't as bad when she was in treatment.      The risks, benefits, alternatives and side effects of any medication changes have been discussed and are understood by the patient and other caregivers.    Review of systems:     ROS was negative unless noted above.          Allergies:   No Known Allergies         Psychiatric " "Examination:   /89   Pulse 83   Temp 97.5  F (36.4  C) (Oral)   Resp 16   Ht 1.626 m (5' 4\")   Wt 101 kg (222 lb 11.2 oz)   SpO2 99%   BMI 38.23 kg/m    Weight is 222 lbs 11.2 oz  Body mass index is 38.23 kg/m .    Appearance:  Awake and alert, dressed in hospital scrubs  Attitude:  cooperative  Eye Contact:  good  Mood:  'normal'  Affect:  intensity is blunted  Speech:  normal prosody  Psychomotor Behavior:  no evidence of tardive dyskinesia, dystonia, or tics  Thought Process:  linear  Associations:  no loose associations  Thought Content:  passive suicidal ideation present, auditory hallucinations present and visual hallucinations present  Insight:  fair  Judgment:  fair  Oriented to:  time, person, and place  Attention Span and Concentration:  fair  Recent and Remote Memory:  intact  Language: English with normal syntax  Fund of Knowledge: appropriate  Muscle Strength and Tone: normal  Gait and Station: Normal         Labs:     No results found for this or any previous visit (from the past 24 hour(s)).       Assessment    Diagnostic Impression:   This patient is a 50 year old female with history of schizophrenia, depression, anxiety, polysubstance use disorder(alcohol,opiods), and post traumatic stress disorder who presented to [facility] ED on 04/23/2020 due to suicidal ideation. This is in the context of feeling overwhelmed and unsafe, recent ejection from her detox facility, and alcohol intoxication. Significant symptoms include suicdal thoughts with a plan to jump off a bridge, auditory hallucinations, visual hallucinations,  paranoid thoughts, cluster B traits of poor coping and difficulty with stable relationships. Substance use from alcohol contributes to patient's presentation. Family history is notable for depression and substance use disorder, hence genetic loading is present. MSE is notable for poorly groomed woman who was drowsy with slurred speech. She has endorses a sad mood,  flat affect " with constricted mobility, tangential thought thought process with no suicidal ideations or hallucinations. Patient's is currently in alcohol withdrawal from intoxication. Her presentation is also suggestive of exacerbation of psychotic features from underlying diagnosis of schizophrenia vs substance induced psychosis. A differential to consider is an underlying schizoaffective disorder. The presence of cluster B traits also raises a high index of suspicion for an unspecified personality disorder.     Reason for inpatient hospitalization is to help patient through withdrawal phase, help her feel better overall, and to contract for safety. Disposition pending clinical stabilization, medication optimization, and formulation of safe discharge plan.    Hospital course: Iliana Barrera was admitted to station 20 as a voluntary patient. After admission she had elevated BPs but her MSSA scores trended down. She had reported a subjective difficult withdrawal and being in a dream like state but had intact higher mental functions and no other suggestion of delirium. She continued to have 2nd and 3rd person AH and Visual Hallucinations. She also continued to report anxiety and low mood. Suicidal ideation had reduced after admission. She reported nightmares and PTA Prazosin was restarted. Quetiapine dose increased for psychotic symptoms. RULE 25 for CD treatment was planned.  On 4/29/2020 she was commenced on Citalopram to address her depressive symptoms.    Discontinued Medications (& Rationale): PO Prazosin 1 mg at bedtime and PO Prozac 20 mg dly    Medical course- Patient had a cough for 2 days duration and IM was consulted. No other respiratory or systemic symptoms.      Plan     Admit to Unit 20 with Attending Physician Errol Harmon MD.  Legal Status: Voluntary     Safety Assessment:   Risk factors for suicide include substance use, chronic mental illness, single status,homelessness,unemployment, and poor family  dynamics. Protective factor include, patient's relationship with one of her daughters.     Acute risk:moderate  Chronic risk:moderate     Pt has not required locked seclusion or restraints in the past 24 hours to maintain safety, please refer to RN documentation for further details.     Precautions: Withdrawal, Suicide     Principal psychiatric diagnosis:     #Schizophrenia, multiple episodes, in acute episode  R/o schizoaffective disorder  # Alcohol use disorder, in uncomplicated withdrawal     Secondary psychiatric diagnoses:   # Post traumatic stress disorder  #Unspecified personality diosrder    Safety Assessment:   Behavioral Orders   Procedures     Code 1 - Restrict to Unit     Routine Programming     As clinically indicated     Self Injury Precaution     Status 15     Every 15 minutes.     Suicide precautions     Patients on Suicide Precautions should have a Combination Diet ordered that includes a Diet selection(s) AND a Behavioral Tray selection for Safe Tray - with utensils, or Safe Tray - NO utensils          Medications:   Changes today:  Commence Citalopram 20 mg daily  Commence Lactulose 30 mls bid  Commence Metronidazole 500mg bid x 7 days ( as recommended by Medicine for Trichomonal infection)     Continue:  -IM/PO Olanzapine 10mg Q2H PRN aggression/agitation  -PO Hydroxyzine 25 mg Q4H PRN  -PO Zofran 4 mg Q6H PRN  -MSSA protocol with 1-4 mg Ativan  -PO Thiamine 100 mg dly  -PO Folic acid 1 mg dly  Quetiapine to 200 mg at night  Mucinex 600 mg BID            Patient will be treated in therapeutic milieu with appropriate individual, group therapies, and medications as above     Medical diagnoses to be addressed:      Trichomonas infection:  -Metronidazole 500mg bid x 7 days     Loratadine 10 mg daily for allergies  Albuterol inhaler/neb PRN for Asthma  Vit D def on supplement  h/o HLD - rpt lipids with dyslipidemia  h/o Gastric bypass  IM consult for cough and IM recs, appreciate recs -- no work up  will monitor for now       Consult: Nutrition consulted and appreciate recs- no current intervention.      Labs: CBC, CMP (ALT: 62, AST:116), Utox(pos for ethanol),HCG-neg, Vit B12, Folate, TSH-wnl. Lipids elevated. STI testing sent at patient request.     Dispo: unknown pending medication management and clinical stabilization     Patient was seen and discussed with the attending.    Sahara Ferguson MD  PGY 1    Attestation:

## 2020-04-29 NOTE — CONSULTS
4/29/2020  Pt's CD evaluation was completed today. The recommendation is MICD residential. Pt's CTC will help place pt in a tx program.

## 2020-04-30 LAB
ALBUMIN SERPL-MCNC: 3.2 G/DL (ref 3.4–5)
ALP SERPL-CCNC: 79 U/L (ref 40–150)
ALT SERPL W P-5'-P-CCNC: 43 U/L (ref 0–50)
ANION GAP SERPL CALCULATED.3IONS-SCNC: 7 MMOL/L (ref 3–14)
AST SERPL W P-5'-P-CCNC: 27 U/L (ref 0–45)
BASOPHILS # BLD AUTO: 0 10E9/L (ref 0–0.2)
BASOPHILS NFR BLD AUTO: 0.3 %
BILIRUB SERPL-MCNC: 0.2 MG/DL (ref 0.2–1.3)
BUN SERPL-MCNC: 13 MG/DL (ref 7–30)
CALCIUM SERPL-MCNC: 8.8 MG/DL (ref 8.5–10.1)
CHLORIDE SERPL-SCNC: 107 MMOL/L (ref 94–109)
CO2 SERPL-SCNC: 24 MMOL/L (ref 20–32)
CREAT SERPL-MCNC: 0.62 MG/DL (ref 0.52–1.04)
DIFFERENTIAL METHOD BLD: ABNORMAL
EOSINOPHIL # BLD AUTO: 0.2 10E9/L (ref 0–0.7)
EOSINOPHIL NFR BLD AUTO: 2.8 %
ERYTHROCYTE [DISTWIDTH] IN BLOOD BY AUTOMATED COUNT: 14.2 % (ref 10–15)
GFR SERPL CREATININE-BSD FRML MDRD: >90 ML/MIN/{1.73_M2}
GLUCOSE SERPL-MCNC: 95 MG/DL (ref 70–99)
HCT VFR BLD AUTO: 35.1 % (ref 35–47)
HGB BLD-MCNC: 11.3 G/DL (ref 11.7–15.7)
IMM GRANULOCYTES # BLD: 0 10E9/L (ref 0–0.4)
IMM GRANULOCYTES NFR BLD: 0.7 %
LYMPHOCYTES # BLD AUTO: 1.7 10E9/L (ref 0.8–5.3)
LYMPHOCYTES NFR BLD AUTO: 30 %
MAGNESIUM SERPL-MCNC: 2.3 MG/DL (ref 1.6–2.3)
MCH RBC QN AUTO: 28.3 PG (ref 26.5–33)
MCHC RBC AUTO-ENTMCNC: 32.2 G/DL (ref 31.5–36.5)
MCV RBC AUTO: 88 FL (ref 78–100)
MONOCYTES # BLD AUTO: 0.5 10E9/L (ref 0–1.3)
MONOCYTES NFR BLD AUTO: 8.9 %
NEUTROPHILS # BLD AUTO: 3.3 10E9/L (ref 1.6–8.3)
NEUTROPHILS NFR BLD AUTO: 57.3 %
NRBC # BLD AUTO: 0 10*3/UL
NRBC BLD AUTO-RTO: 0 /100
PHOSPHATE SERPL-MCNC: 3.1 MG/DL (ref 2.5–4.5)
PLATELET # BLD AUTO: 249 10E9/L (ref 150–450)
POTASSIUM SERPL-SCNC: 3.8 MMOL/L (ref 3.4–5.3)
PROT SERPL-MCNC: 7.6 G/DL (ref 6.8–8.8)
RBC # BLD AUTO: 3.99 10E12/L (ref 3.8–5.2)
SODIUM SERPL-SCNC: 138 MMOL/L (ref 133–144)
WBC # BLD AUTO: 5.8 10E9/L (ref 4–11)

## 2020-04-30 PROCEDURE — 25000132 ZZH RX MED GY IP 250 OP 250 PS 637: Performed by: STUDENT IN AN ORGANIZED HEALTH CARE EDUCATION/TRAINING PROGRAM

## 2020-04-30 PROCEDURE — 80053 COMPREHEN METABOLIC PANEL: CPT | Performed by: INTERNAL MEDICINE

## 2020-04-30 PROCEDURE — 85025 COMPLETE CBC W/AUTO DIFF WBC: CPT | Performed by: INTERNAL MEDICINE

## 2020-04-30 PROCEDURE — G0177 OPPS/PHP; TRAIN & EDUC SERV: HCPCS

## 2020-04-30 PROCEDURE — 12400001 ZZH R&B MH UMMC

## 2020-04-30 PROCEDURE — 36415 COLL VENOUS BLD VENIPUNCTURE: CPT | Performed by: INTERNAL MEDICINE

## 2020-04-30 PROCEDURE — 25000132 ZZH RX MED GY IP 250 OP 250 PS 637: Performed by: INTERNAL MEDICINE

## 2020-04-30 PROCEDURE — 84100 ASSAY OF PHOSPHORUS: CPT | Performed by: INTERNAL MEDICINE

## 2020-04-30 PROCEDURE — 83735 ASSAY OF MAGNESIUM: CPT | Performed by: INTERNAL MEDICINE

## 2020-04-30 PROCEDURE — 99232 SBSQ HOSP IP/OBS MODERATE 35: CPT | Mod: GC | Performed by: PSYCHIATRY & NEUROLOGY

## 2020-04-30 RX ORDER — ATENOLOL 25 MG/1
25 TABLET ORAL ONCE
Status: COMPLETED | OUTPATIENT
Start: 2020-04-30 | End: 2020-04-30

## 2020-04-30 RX ADMIN — METRONIDAZOLE 500 MG: 500 TABLET ORAL at 21:13

## 2020-04-30 RX ADMIN — METRONIDAZOLE 500 MG: 500 TABLET ORAL at 09:19

## 2020-04-30 RX ADMIN — FERROUS SULFATE TAB 325 MG (65 MG ELEMENTAL FE) 325 MG: 325 (65 FE) TAB at 09:19

## 2020-04-30 RX ADMIN — CITALOPRAM HYDROBROMIDE 20 MG: 20 TABLET ORAL at 09:19

## 2020-04-30 RX ADMIN — GUAIFENESIN 600 MG: 600 TABLET, EXTENDED RELEASE ORAL at 21:13

## 2020-04-30 RX ADMIN — SENNOSIDES AND DOCUSATE SODIUM 1 TABLET: 8.6; 5 TABLET ORAL at 21:13

## 2020-04-30 RX ADMIN — PRAZOSIN HYDROCHLORIDE 1 MG: 1 CAPSULE ORAL at 21:13

## 2020-04-30 RX ADMIN — ATENOLOL 25 MG: 25 TABLET ORAL at 22:34

## 2020-04-30 RX ADMIN — THIAMINE HCL TAB 100 MG 100 MG: 100 TAB at 09:19

## 2020-04-30 RX ADMIN — QUETIAPINE FUMARATE 200 MG: 200 TABLET ORAL at 21:13

## 2020-04-30 RX ADMIN — FOLIC ACID 1 MG: 1 TABLET ORAL at 09:20

## 2020-04-30 RX ADMIN — GUAIFENESIN 600 MG: 600 TABLET, EXTENDED RELEASE ORAL at 09:19

## 2020-04-30 RX ADMIN — MULTIPLE VITAMINS W/ MINERALS TAB 1 TABLET: TAB at 09:19

## 2020-04-30 RX ADMIN — LORATADINE 10 MG: 10 TABLET ORAL at 09:19

## 2020-04-30 RX ADMIN — ACETAMINOPHEN 650 MG: 325 TABLET, FILM COATED ORAL at 09:24

## 2020-04-30 RX ADMIN — LACTULOSE 20 G: 10 SOLUTION ORAL at 09:20

## 2020-04-30 RX ADMIN — LACTULOSE 20 G: 10 SOLUTION ORAL at 21:13

## 2020-04-30 ASSESSMENT — ACTIVITIES OF DAILY LIVING (ADL)
DRESS: INDEPENDENT
ORAL_HYGIENE: INDEPENDENT
HYGIENE/GROOMING: INDEPENDENT
ORAL_HYGIENE: INDEPENDENT
HYGIENE/GROOMING: INDEPENDENT
DRESS: SCRUBS (BEHAVIORAL HEALTH);INDEPENDENT

## 2020-04-30 NOTE — PLAN OF CARE
48 hour assessment:  continues to isolative to room.  Out of room periodically, mainly for meals and medications.  Spent most of the day resting in bed with headphones on.  C/o some back pain this morning , given Tylenol with good pain relief.   Reports continued auditory hallucinations . States voices are constantly asking her questions, denies command hallucinations.  Denies visual hallucinations.  Denies SI and SIB.  Reports sleeps well after taking trazodone at HS, but states she wakes up frequently with racing thoughts.  Rates depression 7/10 and anxiety 5/10.

## 2020-04-30 NOTE — PROGRESS NOTES
"   04/30/20 1219   General Information   Date Initially Attended OT 04/30/20     Attended 1/3 occupational therapy groups offered this date. Presented with restricted affect.     Pt attended general health and coping group focused on the '8 Dimensions of Wellness'. Discussion-based group was used to facilitate insight development on holistic whole-person wellness related to occupational performance and satisfaction. Pt Response: Pleasant and cooperative when prompted during discussion. Reported current primary concerns: living in safe comfortable housing, increasing emotional well-being and physical self-cares as stated \"I can have no money or a stash in the bank but that makes no difference if I'm not safe at home... the work I do I can go back to anytime and I'm not all social anyways\".     OT staff will meet with pt to review the role of occupational therapy and explain the value of having them involved in their treatment plan including options to meet current needs/self-identified goals. As group attendance is established,  continued clinical observations will be made and Pt will be given self-assessment to inform OT initial assessment.      "

## 2020-04-30 NOTE — PLAN OF CARE
BEHAVIORAL TEAM DISCUSSION    Participants: Dr. Norton, Dr. Ferguson (Resident), Dr. Purcell (Resident), Karen Montesinos (RN), Kenzie Menjivar (OT), Na Cotter (CTC)    Progress: Progressing; staff report that the patient continues to be somewhat isolative but is reporting an improved mood.     Anticipated length of stay: 3-5 days.    Continued Stay Criteria/Rationale: Continued stabilization and placement in a chemical dependency program.     Medical/Physical: Asthma.   Precautions:   Behavioral Orders   Procedures     Code 1 - Restrict to Unit     Routine Programming     As clinically indicated     Self Injury Precaution     Status 15     Every 15 minutes.     Suicide precautions     Patients on Suicide Precautions should have a Combination Diet ordered that includes a Diet selection(s) AND a Behavioral Tray selection for Safe Tray - with utensils, or Safe Tray - NO utensils       Plan: The patient had a chemical health assessment completed on 4/29/2020. [facility] made CD referrals to [facility], [place], [address], and [facility]. [facility] will plan to follow up on these referrals. Upon appropriate placement being found, the patient will be assessed for discharge.     Rationale for change in precautions or plan: None.

## 2020-04-30 NOTE — PROGRESS NOTES
----------------------------------------------------------------------------------------------------------  [facility], [place]   Psychiatric Progress Note  Hospital Day #7     Interim History:   The patient's care was discussed with the treatment team and chart notes were reviewed.    Sleep:7 hours (04/30/20 0600)    Scheduled Medications:Taken all  PRN: Miralax    Staff Report:Pt was isolative and withdrawn. Pt was observed to be drawing in her room this evening. Pt came for medications and meals. Pt denied any SI/SIB or hallucinations. Pt denied any thoughts of self harm. Pt reported that she is looking forward to starting rehab soon. Pt reported that her depression was 'OK' and that her anxiety was 'OK' as well. Pt didn't take care of any ADL's or take a shower    Patient Interview:  Patient was interviewed via zoom in the conference room on station 20.  At interview says her mood is quiet today, she feels lonely. Appetite and sleep are fine. Still has suicidal thoughts 'occasionally', last time was yesterday says she tried to distract herself by journal ing and listening to music.She affirms that she would be able to tell staff if suicidal thoughts were overwhelming.   She had a BM yesterday after using the lactulose, she feels happy about this. We commenced Citalopram yesterday, has found it helpful in the past, has not noticed any side effects.  Her CD assessment went well, had a rule 25 and is to be considered for inpatient CD. She is looking forward to this.  Nil concerns concerning her current treatment plan.     The risks, benefits, alternatives and side effects of any medication changes have been discussed and are understood by the patient and other caregivers.    Review of systems:     ROS was negative unless noted above.          Allergies:   No Known Allergies         Psychiatric Examination:   /88 (BP Location: Right arm)   Pulse 87   Temp 97.1  F (36.2  C)  "(Temporal)   Resp 16   Ht 1.626 m (5' 4\")   Wt 101 kg (222 lb 11.2 oz)   SpO2 100%   BMI 38.23 kg/m    Weight is 222 lbs 11.2 oz  Body mass index is 38.23 kg/m .    Appearance:  Awake and alert, dressed in hospital scrubs  Attitude:  cooperative  Eye Contact:  good  Mood:  'quiet'  Affect:  mood congruent  Speech:  normal prosody  Psychomotor Behavior:  no evidence of tardive dyskinesia, dystonia, or tics  Thought Process:  linear  Associations:  no loose associations  Thought Content:  passive suicidal ideation present, auditory hallucinations present and visual hallucinations present  Insight:  fair  Judgment:  fair  Oriented to:  time, person, and place  Attention Span and Concentration:  fair  Recent and Remote Memory:  intact  Language: English with normal syntax  Fund of Knowledge: appropriate  Muscle Strength and Tone: normal  Gait and Station: Normal              Labs:   No results found for this or any previous visit (from the past 24 hour(s)).       Assessment    Diagnostic Impression:   This patient is a 50 year old female with history of schizophrenia, depression, anxiety, polysubstance use disorder(alcohol,opiods), and post traumatic stress disorder who presented to [facility] ED on 04/23/2020 due to suicidal ideation. This is in the context of feeling overwhelmed and unsafe, recent ejection from her detox facility, and alcohol intoxication. Significant symptoms include suicdal thoughts with a plan to jump off a bridge, auditory hallucinations, visual hallucinations,  paranoid thoughts, cluster B traits of poor coping and difficulty with stable relationships. Substance use from alcohol contributes to patient's presentation. Family history is notable for depression and substance use disorder, hence genetic loading is present. MSE is notable for poorly groomed woman who was drowsy with slurred speech. She has endorses a sad mood,  flat affect with constricted mobility, tangential thought thought process " with no suicidal ideations or hallucinations. Patient's is currently in alcohol withdrawal from intoxication. Her presentation is also suggestive of exacerbation of psychotic features from underlying diagnosis of schizophrenia vs substance induced psychosis. A differential to consider is an underlying schizoaffective disorder. The presence of cluster B traits also raises a high index of suspicion for an unspecified personality disorder.     Reason for inpatient hospitalization is to help patient through withdrawal phase, help her feel better overall, and to contract for safety. Disposition pending clinical stabilization, medication optimization, and formulation of safe discharge plan    Hospital course:   Iliana Barrera was admitted to station 20 as a voluntary patient. After admission she had elevated BPs but her MSSA scores trended down. She had reported a subjective difficult withdrawal and being in a dream like state but had intact higher mental functions and no other suggestion of delirium. She continued to have 2nd and 3rd person AH and Visual Hallucinations. She also continued to report anxiety and low mood. Suicidal ideation had reduced after admission. She reported nightmares and PTA Prazosin was restarted. Quetiapine dose increased for psychotic symptoms. RULE 25 for CD treatment was planned.  On 4/29/2020 she was commenced on Citalopram to address her depressive symptoms    Discontinued Medications (& Rationale):  PO Prazosin 1 mg at bedtime and PO Prozac 20 mg dly    Medical course Patient had a cough for 2 days duration and IM was consulted. No other respiratory or systemic symptoms.      Plan   Principal psychiatric diagnosis:      #Schizophrenia, multiple episodes, in acute episode  R/o schizoaffective disorder  # Alcohol use disorder, in uncomplicated withdrawal     Secondary psychiatric diagnoses:   # Post traumatic stress disorder  #Unspecified personality disorder    Today's  Changes:  -Appropriate for MICD,  to look out suitable facility     Psychotropic Medications:  Modify:  None    Continue:  -Citalopram 20 mg daily  -IM/PO Olanzapine 10mg Q2H PRN aggression/agitation  -PO Hydroxyzine 25 mg Q4H PRN  -PO Zofran 4 mg Q6H PRN  -MSSA protocol with 1-4 mg Ativan  -PO Thiamine 100 mg dly  -PO Folic acid 1 mg dly  Quetiapine to 200 mg at night  Mucinex 600 mg BID    Patient will be treated in therapeutic milieu with appropriate individual and group therapies as described.      Medical diagnoses to be addressed this admission:    #Trichomonas infection:  -Metronidazole 500mg bid x 7 days      Loratadine 10 mg daily for allergies  Albuterol inhaler/neb PRN for Asthma  Vit D def on supplement  h/o HLD - rpt lipids with dyslipidemia  h/o Gastric bypass  IM consult for cough and IM recs, appreciate recs -- no work up will monitor for now     Data: CBC, CMP (ALT: 62, AST:116), Utox(pos for ethanol),HCG-neg, Vit B12, Folate, TSH-wnl. Lipids elevated. STI testing sent at patient request , Positive for Trichomonas    Consults: Nutrition consulted and appreciate recs- no current intervention    Legal Status: Voluntary    Safety Assessment:   Behavioral Orders   Procedures     Code 1 - Restrict to Unit     Routine Programming     As clinically indicated     Self Injury Precaution     Status 15     Every 15 minutes.     Suicide precautions     Patients on Suicide Precautions should have a Combination Diet ordered that includes a Diet selection(s) AND a Behavioral Tray selection for Safe Tray - with utensils, or Safe Tray - NO utensils         Disposition: unknown pending medication management and clinical stabilization     Patient was seen with  and plan discussed with the attending    Sahara Ferguson, PGY 1  Attestation:  I, Faustino Norton, saw and evaluated the patient with the resident physician.  I agree with the findings and plan of care as documented in the resident note.  I have reviewed  all labs and vital signs.

## 2020-04-30 NOTE — PROGRESS NOTES
Pt was isolative and withdrawn. Pt was observed to be drawing in her room this evening. Pt came for medications and meals. Pt denied any SI/SIB or hallucinations. Pt denied any thoughts of self harm. Pt reported that she is looking forward to starting rehab soon. Pt reported that her depression was 'OK' and that her anxiety was 'OK' as well. Pt didn't take care of any ADL's or take a shower.        04/29/20 1920   Behavioral Health   Hallucinations denies / not responding to hallucinations   Thinking poor concentration   Orientation person: oriented;place: oriented;date: oriented;time: oriented   Memory baseline memory   Insight admits / accepts   Judgement impaired   Eye Contact at examiner   Affect blunted, flat;full range affect   Mood mood is calm   Physical Appearance/Attire attire appropriate to age and situation   Hygiene well groomed   1. Wish to be Dead (Recent) No   2. Non-Specific Active Suicidal Thoughts (Recent) No   Activity isolative;withdrawn   Speech clear;coherent   Psychomotor / Gait balanced;steady   Psycho Education   Type of Intervention 1:1 intervention   Response participates, initiates socially appropriate   Hours 0.5   Treatment Detail Check in   Activities of Daily Living   Hygiene/Grooming independent   Oral Hygiene independent   Dress scrubs (behavioral health);independent   Laundry with supervision   Room Organization independent

## 2020-05-01 VITALS
DIASTOLIC BLOOD PRESSURE: 84 MMHG | BODY MASS INDEX: 38.02 KG/M2 | WEIGHT: 222.7 LBS | HEIGHT: 64 IN | SYSTOLIC BLOOD PRESSURE: 125 MMHG | RESPIRATION RATE: 16 BRPM | OXYGEN SATURATION: 100 % | HEART RATE: 75 BPM | TEMPERATURE: 98.7 F

## 2020-05-01 PROCEDURE — 25000132 ZZH RX MED GY IP 250 OP 250 PS 637: Performed by: STUDENT IN AN ORGANIZED HEALTH CARE EDUCATION/TRAINING PROGRAM

## 2020-05-01 PROCEDURE — 99232 SBSQ HOSP IP/OBS MODERATE 35: CPT | Mod: GC | Performed by: PSYCHIATRY & NEUROLOGY

## 2020-05-01 PROCEDURE — 12400001 ZZH R&B MH UMMC

## 2020-05-01 RX ORDER — METRONIDAZOLE 500 MG/1
500 TABLET ORAL 2 TIMES DAILY
Qty: 8 TABLET | Refills: 0 | Status: SHIPPED | OUTPATIENT
Start: 2020-05-01 | End: 2020-05-05

## 2020-05-01 RX ORDER — CITALOPRAM HYDROBROMIDE 20 MG/1
20 TABLET ORAL DAILY
Qty: 30 TABLET | Refills: 0 | Status: SHIPPED | OUTPATIENT
Start: 2020-05-01

## 2020-05-01 RX ORDER — QUETIAPINE FUMARATE 200 MG/1
200 TABLET, FILM COATED ORAL AT BEDTIME
Qty: 30 TABLET | Refills: 0 | Status: SHIPPED | OUTPATIENT
Start: 2020-05-01 | End: 2020-05-31

## 2020-05-01 RX ORDER — FERROUS SULFATE 325(65) MG
325 TABLET ORAL 3 TIMES DAILY
Status: DISCONTINUED | OUTPATIENT
Start: 2020-05-01 | End: 2020-05-02 | Stop reason: HOSPADM

## 2020-05-01 RX ADMIN — FERROUS SULFATE TAB 325 MG (65 MG ELEMENTAL FE) 325 MG: 325 (65 FE) TAB at 09:01

## 2020-05-01 RX ADMIN — ACETAMINOPHEN 650 MG: 325 TABLET, FILM COATED ORAL at 09:02

## 2020-05-01 RX ADMIN — GUAIFENESIN 600 MG: 600 TABLET, EXTENDED RELEASE ORAL at 21:33

## 2020-05-01 RX ADMIN — METRONIDAZOLE 500 MG: 500 TABLET ORAL at 09:01

## 2020-05-01 RX ADMIN — GUAIFENESIN 600 MG: 600 TABLET, EXTENDED RELEASE ORAL at 09:01

## 2020-05-01 RX ADMIN — MULTIPLE VITAMINS W/ MINERALS TAB 1 TABLET: TAB at 09:01

## 2020-05-01 RX ADMIN — THIAMINE HCL TAB 100 MG 100 MG: 100 TAB at 09:01

## 2020-05-01 RX ADMIN — CITALOPRAM HYDROBROMIDE 20 MG: 20 TABLET ORAL at 09:04

## 2020-05-01 RX ADMIN — CHOLECALCIFEROL CAP 1.25 MG (50000 UNIT) 50000 UNITS: 1.25 CAP at 21:35

## 2020-05-01 RX ADMIN — QUETIAPINE FUMARATE 200 MG: 200 TABLET ORAL at 21:34

## 2020-05-01 RX ADMIN — FOLIC ACID 1 MG: 1 TABLET ORAL at 09:01

## 2020-05-01 RX ADMIN — METRONIDAZOLE 500 MG: 500 TABLET ORAL at 21:35

## 2020-05-01 RX ADMIN — LACTULOSE 20 G: 10 SOLUTION ORAL at 09:01

## 2020-05-01 RX ADMIN — LORATADINE 10 MG: 10 TABLET ORAL at 09:01

## 2020-05-01 RX ADMIN — FERROUS SULFATE TAB 325 MG (65 MG ELEMENTAL FE) 325 MG: 325 (65 FE) TAB at 21:32

## 2020-05-01 RX ADMIN — SENNOSIDES AND DOCUSATE SODIUM 1 TABLET: 8.6; 5 TABLET ORAL at 21:33

## 2020-05-01 RX ADMIN — NICOTINE POLACRILEX 4 MG: 2 LOZENGE ORAL at 21:33

## 2020-05-01 RX ADMIN — LACTULOSE 20 G: 10 SOLUTION ORAL at 21:33

## 2020-05-01 ASSESSMENT — ACTIVITIES OF DAILY LIVING (ADL)
HYGIENE/GROOMING: INDEPENDENT
LAUNDRY: WITH SUPERVISION
ORAL_HYGIENE: INDEPENDENT
DRESS: SCRUBS (BEHAVIORAL HEALTH)

## 2020-05-01 NOTE — PROGRESS NOTES
Pt is isolative. Pt was observed talking herself in her room earlier in the shift. When pt was approached to check in pt appeared very intense and did not really show any interest to check in. Pt denies all mental health symptoms.          05/01/20 1300   Behavioral Health   Hallucinations denies / not responding to hallucinations   Thinking distractable;paranoid;poor concentration   Orientation person: oriented;place: oriented;date: oriented;time: oriented   Memory baseline memory   Insight admits / accepts   Judgement impaired   Eye Contact at examiner   Affect blunted, flat   Mood mood is calm   Physical Appearance/Attire appears stated age;attire appropriate to age and situation;neat   Hygiene well groomed   Suicidality   (denies)   1. Wish to be Dead (Recent) No   2. Non-Specific Active Suicidal Thoughts (Recent) No   Self Injury   (denies)   Activity isolative;withdrawn   Speech clear;coherent   Psychomotor / Gait balanced;steady   Activities of Daily Living   Hygiene/Grooming independent   Oral Hygiene independent   Dress scrubs (behavioral health)   Laundry with supervision   Room Organization independent

## 2020-05-01 NOTE — PROGRESS NOTES
Pt was withdrawn this evening being in her room most of the shift. Pt came out for meals. Pt denied any SI/SIB or hallucinations. Pt denied any thoughts of self harm. Pt didn't shower this evening or take care of any ADL''s. No concerns were observed or reported.       04/30/20 5074   Behavioral Health   Hallucinations denies / not responding to hallucinations   Thinking intact   Orientation person: oriented;place: oriented;date: oriented;time: oriented   Memory baseline memory   Insight insight appropriate to situation   Judgement impaired   Eye Contact at examiner   Affect blunted, flat;full range affect   Mood mood is calm   Physical Appearance/Attire neat   Hygiene well groomed   1. Wish to be Dead (Recent) No   2. Non-Specific Active Suicidal Thoughts (Recent) No   Self Injury other (see comment)  (denies)   Activity isolative;withdrawn   Speech clear;coherent   Psychomotor / Gait balanced;steady   Psycho Education   Type of Intervention 1:1 intervention   Response participates, initiates socially appropriate   Hours 0.5   Treatment Detail Check in   Activities of Daily Living   Hygiene/Grooming independent   Oral Hygiene independent   Dress scrubs (behavioral health);independent   Room Organization independent

## 2020-05-01 NOTE — DISCHARGE SUMMARY
"    Psychiatric Discharge Summary    Hospital Day #8    Iliana Barrera MRN# [id number]   Age: 50 year old YOB: 1970     Date of Admission:  4/23/2020  Date of Discharge:  5/2/20  Admitting Physician:  Faustino Norton MD  Discharge Physician:  Neeraj Salas MD         Event Leading to Hospitalization:   This patient is a 50 year old female with a history of depression, post traumatic stress disorder, schizophrenia, personality disorder, polysubstance use disorder (Alcohol and opioids) and dyslexia who presented on 04/23/2020 due to alcohol intoxication and suicidal ideation with a plan to jump off a bridge.     She was medically cleared for admission to inpatient psychiatric unit.     Per ED report:  \"Iliana Barrera is a 50 year old female with a past medical history pertinent for polysubstance abuse, anemia, hypercholesterolemia, uncomplicated asthma, and s/p gastric bypass who presents to the Emergency Department for substance abuse.  History is somewhat difficult to obtain secondary to the patient's current state.  She reports that she asked a friend to drive her here today.  She has a history of opiate abuse as well as alcohol abuse.  She claims to be drinking 2 18 packs of beer daily in addition to 2 pints of vodka daily.  She has been doing this daily for an undetermined amount of time \"ever since I was kicked out of rehab \".  Patient was apparently in a  rehab treatment facility and was asked to leave earlier this month \"because some chic stole my food \".  She has been drinking daily since this time.  She is unable to identify exactly when she left rehab.  She admits to using oxycodone and Percocet \"whenever I am in the mood \".  She is unable to quantify how much she is using or how often.  She denies IV drug use.  She denies any history of withdrawal seizures or DTs.     Record review shows a history of depression.  The patient herself says she has \"OCD, PTSD, schizophrenia, multiple " "personality disorder \".  She claims that she hears voices all the time.  She is resistant to telling me what the voices are saying to her but states this is been going on her whole life.  She states that she will talk out loud to the voices and that people around her tell her to \"shut the f--- up \".  When asked about any suicidal ideation, her answer is \"the river is free \".  When asked to clarify this she states that she could easily jump off the bridge and she would drown because she does not know how to swim.  She reports this is her current plan.  It is unclear if she is had any prior suicide attempts.  When specifically asked if she would contract for safety if we are able to procure a detox bed for her, she immediately agrees to this.\"     Per patient report:    Patient states that she doesn't remember why she's here but remembers being [place] [employer] [place] and her daughter bring her here. She states that yesterday she was in a lot of pain and her head felt fuzzy. She reports that 3 weeks ago, she was kicked out of her detox facility. She states that someone kept trying to take her food and when she tried to stop her, they told her that she was \"aggressive\" and they kicked her out. She has been leaving in a hotel with someone she met recently, the person dropped her off at the train station yesterday because he had to go see his mother. Patient reports that she has been hearing voices in her head all her life and states that they got worse yesterday. She also reports seeing shadows around her and having thoughts that someone was out to get her which she describes as \"paranoid\".  Patient states that she drank about 2 pints of alcohol  Yesterday to help her feel better about everything.     Patient reports strong suicidal thoughts with a plan yesterday. She \"just wanted to end her life but she wanted to do it quickly so she wouldn't have pain\". She adds that \"when I go, I want to do it quickly " "like taking pills or falling on my head so it happens fast\". She was planning to jump off a bridge to end her life. She denies having thoughts to kill herself durng the interview. Patient describes her mood as \"in the middle\". She states that \"I have never been very sad or too happy\". She reports that her major goals for being here are to receive help for her drinking and have a plan for what she's going to do when she is discharged.     During the interview, patient was experiencing withdrawal symptoms of tremors, sweatiness, abdominal pain, nausea, and general malaise. She states that this feels slightly worse than her previous withdrawals and expects it to last about 2 days. Denies history of withdrawal seizures.          See Admission note by Faustino Norton MD on 4/24/2020 for additional details.          Diagnoses:   Principal psychiatric diagnosis:   #Schizophrenia, multiple episodes, in acute episode  R/o schizoaffective disorder   # Alcohol use disorder, in withdrawal    Secondary psychiatric diagnoses:   # Post traumatic stress disorder  #Unspecified personality diosrder         Consults:   -CD consult  -Internal Medicine         Hospital Course:   Diagnostic Assessment:  This patient is a 50 year old female with history of schizophrenia, depression, anxiety, polysubstance use disorder(alcohol,opiods), and post traumatic stress disorder who presented to [facility] ED on 04/23/2020 due to suicidal ideation. This was in the context of feeling overwhelmed and unsafe, recent ejection from her detox facility, and alcohol intoxication. Significant symptoms included suicidal thoughts with a plan to jump off a bridge, auditory hallucinations, visual hallucinations,  paranoid thoughts, cluster B traits of poor coping and difficulty with stable relationships. Substance use from alcohol contributed to patient's presentation. Family history was notable for depression and substance use disorder. MSE was notable for " a poorly groomed woman who was drowsy with slurred speech. She  endorsed a sad mood,  flat affect with constricted mobility, tangential thought  process with no suicidal ideations or hallucinations. She was  in alcohol withdrawal from intoxication. Her presentation was also suggestive of exacerbation of psychotic features from underlying diagnosis of schizophrenia vs substance induced psychosis. A differential of schizoaffective disorder was also considered. The presence of cluster B traits  raised a high index of suspicion for an unspecified personality disorder.  Reason for inpatient hospitalization was to help patient through withdrawal phase, help her feel better overall, and to keep her safe.     Psychiatric Course:  Iliana Barrera was admitted to station 20 as a voluntary patient.  On admission her PTA medications were continued.  MSSA withdrawal protocol was commenced as she was noticed to be in withdrawal. A therapeutic and safe environment was provided. She was reviewed daily by the team, she complained of ongoing ongoing AH and VH, for which her Quetiapine was increased, and she noted a 'calming' of the hallucinations. She reported low mood, anxiety and suicidal thoughts, she reported that she had benefited from Citalopram in the past and so this was recommenced. Prazosin was introduced to help with nightmares, but was later discontinued as she reported symptoms suggestive of orthostatic hypotension.   She had a CD consult to address her alcohol addiction and she was recommended for Inpatient treatment. Iliana expressed willingness to go for inpatient treatment as she felt she had a positive experience in the past.   In the course of the admission Iliana's mental state was noted to improve, she continued to keep to herself on the unit, but reported an improvemet in her mood and did not endorse suicidal thoughts or plan.     On 5/01/2020 she requested to be discharged home to her daughter while  waiting for a place in the treatment center. The team spoke with Iliana and her family and they expressed that they felt this would be the best course of action for her. Discharge was planned for 5/2/20.     On 5/2/20, patient was irritable during our interview. After speaking with staff on the unit, they believed she was somewhat irritable due to contextual stressor of being told she would be discharging earlier in the morning -- they corroborated that she had been calm and cooperative throughout the most recent days of her hospital stay. Patient denied SI/HI at time of discharge. At the time of discharge Iliana Barrera was determined to not be a danger to herself or others (see risk assessment below). Iliana Barrera was discharged to daughter's house.     Medical Course:  #Trichomonas infection:  -Metronidazole 500mg bid x 7 days      #Anemia  -Ferrous Sulphate to tid      #Sinus Tachycardia   -s/p Atenolol x 1 dose  -Monitor     Loratadine 10 mg daily for allergies  Albuterol inhaler/neb PRN for Asthma  Vit D def on supplement  h/o HLD - rpt lipids with dyslipidemia  h/o Gastric bypass  IM consult for cough and IM recs, appreciate recs -- no work up will monitor for now     Risk Assessment:  Iliana Barrera has notable risk factors for self-harm, including anxiety, psychosis, past attempt  and substance abuse. However, risk is mitigated by commitment to family, ability to volunteer a safety plan and history of seeking help when needed. Additional steps taken to minimize risk include: Referral for MICD. Therefore, based on all available evidence including the factors cited above, Iliana Barrera does not appear to be at imminent risk for self-harm, and is appropriate for outpatient level of care.     This document serves as a transfer of care to Iliana Barrera's outpatient providers.         Discharge Medications:     Current Discharge Medication List      START taking these medications    Details    metroNIDAZOLE (FLAGYL) 500 MG tablet Take 1 tablet (500 mg) by mouth 2 times daily for 4 days  Qty: 8 tablet, Refills: 0    Associated Diagnoses: Infection      QUEtiapine (SEROQUEL) 200 MG tablet Take 1 tablet (200 mg) by mouth At Bedtime  Qty: 30 tablet, Refills: 0    Associated Diagnoses: Severe episode of recurrent major depressive disorder, with psychotic features (H)         CONTINUE these medications which have CHANGED    Details   citalopram (CELEXA) 20 MG tablet Take 1 tablet (20 mg) by mouth daily  Qty: 30 tablet, Refills: 0    Associated Diagnoses: Severe episode of recurrent major depressive disorder, with psychotic features (H)         CONTINUE these medications which have NOT CHANGED    Details   calcium carbonate (OS-JOSE DE JESUS) 1500 (600 Ca) MG tablet Take by mouth 2 times daily (with meals)      Cyanocobalamin (VITAMIN B 12 PO) Take by mouth daily       Ferrous Gluconate 324 (37.5 Fe) MG TABS Take 1 tablet by mouth 3 times daily      loratadine (CLARITIN) 10 MG tablet Take 10 mg by mouth daily      multivitamin, therapeutic (THERA-VIT) TABS tablet Take 1 tablet by mouth daily      ranitidine (ZANTAC) 150 MG tablet Take 1 tablet by mouth 2 times daily       traZODone (DESYREL) 50 MG tablet Take 50 mg by mouth At Bedtime      albuterol (PROAIR HFA, PROVENTIL HFA, VENTOLIN HFA) 108 (90 BASE) MCG/ACT inhaler Inhale 2 puffs into the lungs every 6 hours as needed for shortness of breath / dyspnea or wheezing  Qty: 1 Inhaler, Refills: 12    Associated Diagnoses: Intermittent asthma         STOP taking these medications       cholecalciferol (VITAMIN D3) 91446 UNITS capsule Comments:   Reason for Stopping:         folic acid (FOLVITE) 1 MG tablet Comments:   Reason for Stopping:         prazosin (MINIPRESS) 1 MG capsule Comments:   Reason for Stopping:                      Psychiatric Examination:   Appearance: Awake, alert, no acute distress.   Grooming: Adequate   Dress: Hospital garb   Attitude:   Argumentative  Eye Contact:  Good   Mood: Angry  Affect: Tense, quickly irritable, reactivity is heightened, intensity is heightened, mood-congruent.  Speech:    Rate: Somewhat fast    Latency: Normal   Volume: Variable. Loud at times   Other: Clear and coherent.   Psychomotor Behavior: No evidence of tics, TD, or dystonia. No evidence of psychomotor agitation or retardation.   Thought Process: Logical, organized, linear.   Associations: No evidence of loosening of associations   Thought Content: Denied SI/HI. No overt evidence of delusional thought content. Not appearing to RTIS.    Insight: Fair   Judgment: Fair  Orientation: Grossly intact   Attention Span and Concentration: Intact to our conversation   Recent and Remote Memory: Intact   Language: Fluent and conversant in English.    Fund of Knowledge: Adequate  Muscle Strength and Tone: Normal   Gait and Station: Normal         Discharge Plan:   Health Care Follow-up Appointments:   Residential Chemical Dependency Treatment   During your hospitalization you completed a chemical dependency assessment with a recommendation for residential chemical dependency treatment. Listed below are the places you referral was sent to for placement. Please follow up with each of the agencies listed individually to schedule an admission date for treatment.      [facility]  [address] [place] [address]  Phone: [phone]     [facility]  [address] [place] [address]  Phone:[phone]     [facility]   [address] [place] [address]  Phone: [phone]     Primary Care   [facility]  [address] [place] [address]  Phone: [phone]  During the time of your discharge, the scheduling department was not available. Please call the number listed above during the week to schedule an appointment with your outpatient psychiatrist and primary care provider.       Resources   [facility]  [address] [place] [address]  Phone: [phone]     [facility]   [facility] [place] [address]  Phone: [phone]  *Please note that the clinic is currently conducting sessions via telemedicine. Please use the number listed above to call and get more information**    Pt seen and discussed with my attending, MD Balaji Rubio MD   PGY-2 Psychiatry Resident     Attestation:  Physician Attestation   I, Neeraj Salas MD, saw this patient via telehealth with Dr Crouch and agree with the findings and plan of care as documented in the note.  We have concerns about patient's compliance with the new added seroquel.     Neeraj Salas MD          Appendix A: All Labs This Admission:     Results for orders placed or performed during the hospital encounter of 04/23/20   Drug abuse screen 6 urine (tox)     Status: Abnormal   Result Value Ref Range    Amphetamine Qual Urine Negative NEG^Negative    Barbiturates Qual Urine Negative NEG^Negative    Benzodiazepine Qual Urine Negative NEG^Negative    Cannabinoids Qual Urine Negative NEG^Negative    Cocaine Qual Urine Negative NEG^Negative    Ethanol Qual Urine Positive (A) NEG^Negative    Opiates Qualitative Urine Negative NEG^Negative   HCG qualitative urine (UPT)     Status: None   Result Value Ref Range    HCG Qual Urine Negative NEG^Negative   CBC with platelets differential     Status: None   Result Value Ref Range    WBC 7.8 4.0 - 11.0 10e9/L    RBC Count 4.72 3.8 - 5.2 10e12/L    Hemoglobin 13.4 11.7 - 15.7 g/dL    Hematocrit 39.9 35.0 - 47.0 %    MCV 85 78 - 100 fl    MCH 28.4 26.5 - 33.0 pg    MCHC 33.6 31.5 - 36.5 g/dL    RDW 13.7 10.0 - 15.0 %    Platelet Count 335 150 - 450 10e9/L    Diff Method Automated Method     % Neutrophils 59.8 %    % Lymphocytes 36.0 %    % Monocytes 3.2 %    % Eosinophils 0.6 %    % Basophils 0.3 %    % Immature Granulocytes 0.1 %    Nucleated RBCs 0 0 /100    Absolute Neutrophil 4.7 1.6 - 8.3 10e9/L     Absolute Lymphocytes 2.8 0.8 - 5.3 10e9/L    Absolute Monocytes 0.3 0.0 - 1.3 10e9/L    Absolute Eosinophils 0.1 0.0 - 0.7 10e9/L    Absolute Basophils 0.0 0.0 - 0.2 10e9/L    Abs Immature Granulocytes 0.0 0 - 0.4 10e9/L    Absolute Nucleated RBC 0.0    Comprehensive metabolic panel     Status: Abnormal   Result Value Ref Range    Sodium 136 133 - 144 mmol/L    Potassium 4.0 3.4 - 5.3 mmol/L    Chloride 103 94 - 109 mmol/L    Carbon Dioxide 27 20 - 32 mmol/L    Anion Gap 6 3 - 14 mmol/L    Glucose 108 (H) 70 - 99 mg/dL    Urea Nitrogen 13 7 - 30 mg/dL    Creatinine 0.44 (L) 0.52 - 1.04 mg/dL    GFR Estimate >90 >60 mL/min/[1.73_m2]    GFR Estimate If Black >90 >60 mL/min/[1.73_m2]    Calcium 9.4 8.5 - 10.1 mg/dL    Bilirubin Total 0.3 0.2 - 1.3 mg/dL    Albumin 3.9 3.4 - 5.0 g/dL    Protein Total 8.9 (H) 6.8 - 8.8 g/dL    Alkaline Phosphatase 101 40 - 150 U/L    ALT 62 (H) 0 - 50 U/L     (H) 0 - 45 U/L   TSH with free T4 reflex and/or T3 as indicated     Status: None   Result Value Ref Range    TSH 1.15 0.40 - 4.00 mU/L   Folate     Status: None   Result Value Ref Range    Folate 30.8 >5.4 ng/mL   Vitamin B12     Status: None   Result Value Ref Range    Vitamin B12 198 193 - 986 pg/mL   Vitamin D     Status: None   Result Value Ref Range    Vitamin D Deficiency screening 20 20 - 75 ug/L   Treponema Abs w Reflex to RPR and Titer     Status: None   Result Value Ref Range    Treponema Antibodies Nonreactive NR^Nonreactive   Lipid panel reflex to direct LDL     Status: Abnormal   Result Value Ref Range    Cholesterol 222 (H) <200 mg/dL    Triglycerides 125 <150 mg/dL    HDL Cholesterol 61 >49 mg/dL    LDL Cholesterol Calculated 136 (H) <100 mg/dL    Non HDL Cholesterol 161 (H) <130 mg/dL   CBC with platelets differential     Status: Abnormal   Result Value Ref Range    WBC 5.8 4.0 - 11.0 10e9/L    RBC Count 3.99 3.8 - 5.2 10e12/L    Hemoglobin 11.3 (L) 11.7 - 15.7 g/dL    Hematocrit 35.1 35.0 - 47.0 %    MCV 88  78 - 100 fl    MCH 28.3 26.5 - 33.0 pg    MCHC 32.2 31.5 - 36.5 g/dL    RDW 14.2 10.0 - 15.0 %    Platelet Count 249 150 - 450 10e9/L    Diff Method Automated Method     % Neutrophils 57.3 %    % Lymphocytes 30.0 %    % Monocytes 8.9 %    % Eosinophils 2.8 %    % Basophils 0.3 %    % Immature Granulocytes 0.7 %    Nucleated RBCs 0 0 /100    Absolute Neutrophil 3.3 1.6 - 8.3 10e9/L    Absolute Lymphocytes 1.7 0.8 - 5.3 10e9/L    Absolute Monocytes 0.5 0.0 - 1.3 10e9/L    Absolute Eosinophils 0.2 0.0 - 0.7 10e9/L    Absolute Basophils 0.0 0.0 - 0.2 10e9/L    Abs Immature Granulocytes 0.0 0 - 0.4 10e9/L    Absolute Nucleated RBC 0.0    Comprehensive metabolic panel     Status: Abnormal   Result Value Ref Range    Sodium 138 133 - 144 mmol/L    Potassium 3.8 3.4 - 5.3 mmol/L    Chloride 107 94 - 109 mmol/L    Carbon Dioxide 24 20 - 32 mmol/L    Anion Gap 7 3 - 14 mmol/L    Glucose 95 70 - 99 mg/dL    Urea Nitrogen 13 7 - 30 mg/dL    Creatinine 0.62 0.52 - 1.04 mg/dL    GFR Estimate >90 >60 mL/min/[1.73_m2]    GFR Estimate If Black >90 >60 mL/min/[1.73_m2]    Calcium 8.8 8.5 - 10.1 mg/dL    Bilirubin Total 0.2 0.2 - 1.3 mg/dL    Albumin 3.2 (L) 3.4 - 5.0 g/dL    Protein Total 7.6 6.8 - 8.8 g/dL    Alkaline Phosphatase 79 40 - 150 U/L    ALT 43 0 - 50 U/L    AST 27 0 - 45 U/L   Magnesium     Status: None   Result Value Ref Range    Magnesium 2.3 1.6 - 2.3 mg/dL   Phosphorus     Status: None   Result Value Ref Range    Phosphorus 3.1 2.5 - 4.5 mg/dL   EKG 12-lead, complete     Status: None (Preliminary result)   Result Value Ref Range    Interpretation ECG Click View Image link to view waveform and result    Internal Medicine Adult IP Consult for [facility]: Patient to be seen: Routine within 24 hrs; Call back #: [phone]; cough with sputum x 2 days; Consultant may enter orders: Yes; Requesting provider? Attending physician; Name:...     Status: None ()    Narrative    Preethi Yuan PA-C      4/27/2020  1:22 PM  See prior medicine note   Chemical Dependency IP Consult     Status: None ()    Narrative    Desirae Sullivan AAMIR, [facility]     4/29/2020  3:03 PM  4/29/2020  Pt's CD evaluation was completed today. The recommendation is   MICD residential. Pt's CTC will help place pt in a tx program.   Alcohol breath test POCT     Status: Abnormal   Result Value Ref Range    Alcohol Breath Test 0.289 (A) 0.00 - 0.01   Alcohol breath test POCT     Status: Abnormal   Result Value Ref Range    Alcohol Breath Test 0.177 (A) 0.00 - 0.01   Chlamydia trachomatis PCR     Status: None    Specimen: Urine   Result Value Ref Range    Specimen Description Urine     Chlamydia Trachomatis PCR Negative NEG^Negative   Neisseria gonorrhoea PCR     Status: None    Specimen: Urine   Result Value Ref Range    Specimen Descrip Urine     N Gonorrhea PCR Negative NEG^Negative   Trichomonas vaginalis DNA PCR     Status: Abnormal    Specimen: Urine   Result Value Ref Range    Specimen Description Urine     Trichomonas vaginalis DNA PCR Detected: Trichomonas target DNA is detected. (A) NOTV^Not Detected: Trichomonas target DNA is not detected. All internal controls meet acceptance criteria.

## 2020-05-01 NOTE — PROGRESS NOTES
Informed that Iliana was looking to discharge tomorrow to her daughter and then go to treatment from there.  She told me that she was feeling paranoid on the unit after episode of Tachycardia yesterday, 'I don't know what is happening with my medications'. I have reassured her that I went through her medication list with the medical team and they are not likely the culprit. Her ECG and labs were fine and there is nothing further to do,however if it were to recur she should seek medical attention.  I have spoken to her daughter Jack with whom she intends to stay and she is happy for her mom to stay with her. I also addressed the recent health event and answered her questions.  Iliana, at this time does not endorse suicidal thoughts.    Plan:  -For discharge home with daughter tomorrow, while awaiting treatment bed.

## 2020-05-01 NOTE — PROGRESS NOTES
"Brief medicine note:      I was notified that patient had tachycardia overnight. Provider was contacted overnight and EKG was obtained showing sinus tachycardia at 119/min. She received Atenolol 25 mg and IVF. CMP, mag, phos and CBC were unremarkable. HR normalized this AM. Discussed with primary team, psychiatry. Patient reports feeling \"back to normal\" today. Psychiatry did not feel consult was necessary. Psychiatry will monitor for recurrent tachycardia.     Please notify medicine if any recurrent tachycardia or any other questions or concerns.     Charlene Singh PA-C  Internal Medicine [facility] Hospitalist   [phone]    "

## 2020-05-01 NOTE — PROGRESS NOTES
Pt notified this staff of tachycardia. Initially thought to be related to anxiety, but pt denied this and deep breathing exercises did not help. , normotensive, afebrile, RR of 16. House MD paged. EKG done- sinus tach, and atenolol given. Labs ordered. Pt otherwise in no acute distress and is resting calmly. Pt denies cardiac hx or known side effects from Rx.

## 2020-05-01 NOTE — PROGRESS NOTES
Writer spoke with Isabela with [facility]. She reported that she did receive the patient's referral and will review it between today and Monday and call writer back.

## 2020-05-01 NOTE — PROGRESS NOTES
----------------------------------------------------------------------------------------------------------  [facility], [place]   Psychiatric Progress Note  Hospital Day #8     Interim History:   The patient's care was discussed with the treatment team and chart notes were reviewed.    Sleep:7 hours (05/01/20 0624)    Scheduled Medications:Taken all  PRN: Miralax    Staff Report:Pt was withdrawn this evening being in her room most of the shift. Pt came out for meals. Pt denied any SI/SIB or hallucinations. Pt denied any thoughts of self harm. Pt didn't shower this evening or take care of any ADL''s. No concerns were observed or reported. Episode of tachycardia yesterday, reviewed by medicine.    Patient Interview:  Patient was interviewed via zoom in the conference room on station 20.  She was tachycardic yesterday, today she feels fine. We inform her that her labs and EKG are fine. HB is low, she has chronic anemia from heavy menses and is routinely on iron tablets tid. She endorses feeling dizzy when she gets up usually and this has been ongoing for a while. She is concerned abput her medications as she says the tachycardia started after she had her night medications, we have reassured her that her medications have been the same and not likely to be the culprits, but we will consider discontinuing the Prazosin because of the dizziness.  Her mood is 'normal' today. Her sleep was poor last night, likely because of the tachycardia. Appetite is ok. The 'voices' are 'calming down'. She does not endorse any suicidal thoughts. Nil side effects reported other than as stated above.  She is looking forward to going into residential treatment and we inform that this is being looked into.      The risks, benefits, alternatives and side effects of any medication changes have been discussed and are understood by the patient and other caregivers.     Review of systems:     ROS was negative unless  "noted above.          Allergies:   No Known Allergies         Psychiatric Examination:   /85 (BP Location: Right arm)   Pulse 148   Temp 99.6  F (37.6  C)   Resp 16   Ht 1.626 m (5' 4\")   Wt 101 kg (222 lb 11.2 oz)   SpO2 100%   BMI 38.23 kg/m    Weight is 222 lbs 11.2 oz  Body mass index is 38.23 kg/m .    Appearance:  Awake and alert, dressed in hospital scrubs  Attitude:  cooperative  Eye Contact:  good  Mood:  'normal'  Affect:  mood congruent, intensity is blunted  Speech:  normal prosody  Psychomotor Behavior:  no evidence of tardive dyskinesia, dystonia, or tics  Thought Process:  linear  Associations:  no loose associations  Thought Content:  passive suicidal ideation present, auditory hallucinations  Insight:  fair  Judgment:  fair  Oriented to:  time, person, and place  Attention Span and Concentration:  fair  Recent and Remote Memory:  intact  Language: English with normal syntax  Fund of Knowledge: appropriate  Muscle Strength and Tone: appears normal (unable to assess)  Gait and Station: Normal              Labs:     Recent Results (from the past 24 hour(s))   EKG 12-lead, complete    Collection Time: 04/30/20 10:24 PM   Result Value Ref Range    Interpretation ECG Click View Image link to view waveform and result    CBC with platelets differential    Collection Time: 04/30/20 10:59 PM   Result Value Ref Range    WBC 5.8 4.0 - 11.0 10e9/L    RBC Count 3.99 3.8 - 5.2 10e12/L    Hemoglobin 11.3 (L) 11.7 - 15.7 g/dL    Hematocrit 35.1 35.0 - 47.0 %    MCV 88 78 - 100 fl    MCH 28.3 26.5 - 33.0 pg    MCHC 32.2 31.5 - 36.5 g/dL    RDW 14.2 10.0 - 15.0 %    Platelet Count 249 150 - 450 10e9/L    Diff Method Automated Method     % Neutrophils 57.3 %    % Lymphocytes 30.0 %    % Monocytes 8.9 %    % Eosinophils 2.8 %    % Basophils 0.3 %    % Immature Granulocytes 0.7 %    Nucleated RBCs 0 0 /100    Absolute Neutrophil 3.3 1.6 - 8.3 10e9/L    Absolute Lymphocytes 1.7 0.8 - 5.3 10e9/L    Absolute " Monocytes 0.5 0.0 - 1.3 10e9/L    Absolute Eosinophils 0.2 0.0 - 0.7 10e9/L    Absolute Basophils 0.0 0.0 - 0.2 10e9/L    Abs Immature Granulocytes 0.0 0 - 0.4 10e9/L    Absolute Nucleated RBC 0.0    Comprehensive metabolic panel    Collection Time: 04/30/20 10:59 PM   Result Value Ref Range    Sodium 138 133 - 144 mmol/L    Potassium 3.8 3.4 - 5.3 mmol/L    Chloride 107 94 - 109 mmol/L    Carbon Dioxide 24 20 - 32 mmol/L    Anion Gap 7 3 - 14 mmol/L    Glucose 95 70 - 99 mg/dL    Urea Nitrogen 13 7 - 30 mg/dL    Creatinine 0.62 0.52 - 1.04 mg/dL    GFR Estimate >90 >60 mL/min/[1.73_m2]    GFR Estimate If Black >90 >60 mL/min/[1.73_m2]    Calcium 8.8 8.5 - 10.1 mg/dL    Bilirubin Total 0.2 0.2 - 1.3 mg/dL    Albumin 3.2 (L) 3.4 - 5.0 g/dL    Protein Total 7.6 6.8 - 8.8 g/dL    Alkaline Phosphatase 79 40 - 150 U/L    ALT 43 0 - 50 U/L    AST 27 0 - 45 U/L   Magnesium    Collection Time: 04/30/20 10:59 PM   Result Value Ref Range    Magnesium 2.3 1.6 - 2.3 mg/dL   Phosphorus    Collection Time: 04/30/20 10:59 PM   Result Value Ref Range    Phosphorus 3.1 2.5 - 4.5 mg/dL          Assessment    Diagnostic Impression:   This patient is a 50 year old female with history of schizophrenia, depression, anxiety, polysubstance use disorder(alcohol,opiods), and post traumatic stress disorder who presented to [facility] ED on 04/23/2020 due to suicidal ideation. This is in the context of feeling overwhelmed and unsafe, recent ejection from her detox facility, and alcohol intoxication. Significant symptoms include suicdal thoughts with a plan to jump off a bridge, auditory hallucinations, visual hallucinations,  paranoid thoughts, cluster B traits of poor coping and difficulty with stable relationships. Substance use from alcohol contributes to patient's presentation. Family history is notable for depression and substance use disorder, hence genetic loading is present. MSE is notable for poorly groomed woman who was drowsy with slurred  speech. She has endorses a sad mood,  flat affect with constricted mobility, tangential thought thought process with no suicidal ideations or hallucinations. Patient's is currently in alcohol withdrawal from intoxication. Her presentation is also suggestive of exacerbation of psychotic features from underlying diagnosis of schizophrenia vs substance induced psychosis. A differential to consider is an underlying schizoaffective disorder. The presence of cluster B traits also raises a high index of suspicion for an unspecified personality disorder.     Reason for inpatient hospitalization is to help patient through withdrawal phase, help her feel better overall, and to contract for safety. Disposition pending clinical stabilization, medication optimization, and formulation of safe discharge plan    Hospital course:   Iliana Barrera was admitted to station 20 as a voluntary patient. After admission she had elevated BPs but her MSSA scores trended down. She had reported a subjective difficult withdrawal and being in a dream like state but had intact higher mental functions and no other suggestion of delirium. She continued to have 2nd and 3rd person AH and Visual Hallucinations. She also continued to report anxiety and low mood. Suicidal ideation had reduced after admission. She reported nightmares and PTA Prazosin was restarted. Quetiapine dose increased for psychotic symptoms. RULE 25 for CD treatment was planned.  On 4/29/2020 she was commenced on Citalopram to address her depressive symptoms    Discontinued Medications (& Rationale):  PO Prazosin 1 mg at bedtime and PO Prozac 20 mg dly    Medical course Patient had a cough for 2 days duration and IM was consulted. No other respiratory or systemic symptoms.      Plan   Principal psychiatric diagnosis:      #Schizophrenia, multiple episodes, in acute episode  R/o schizoaffective disorder  # Alcohol use disorder, in uncomplicated withdrawal     Secondary psychiatric  diagnoses:   # Post traumatic stress disorder  #Unspecified personality disorder    Today's Changes:  -Increase in Ferrous sulphate to tid     Psychotropic Medications:  Modify:  Discontinue Prazosin 1 mg at bedtime    Continue:  -Citalopram 20 mg daily  -IM/PO Olanzapine 10mg Q2H PRN aggression/agitation  -PO Hydroxyzine 25 mg Q4H PRN  -PO Zofran 4 mg Q6H PRN  -PO Thiamine 100 mg dly  -PO Folic acid 1 mg dly  Quetiapine to 200 mg at night  Mucinex 600 mg BID    Patient will be treated in therapeutic milieu with appropriate individual and group therapies as described.      Medical diagnoses to be addressed this admission:    #Trichomonas infection:  -Metronidazole 500mg bid x 7 days     #Anemia  -Ferrous Sulphate to tid     #Sinus Tachycardia   -s/p Atenolol x 1 dose  -Monitor     Loratadine 10 mg daily for allergies  Albuterol inhaler/neb PRN for Asthma  Vit D def on supplement  h/o HLD - rpt lipids with dyslipidemia  h/o Gastric bypass  IM consult for cough and IM recs, appreciate recs -- no work up will monitor for now     Data: CBC, CMP (ALT: 62, AST:116), Utox(pos for ethanol),HCG-neg, Vit B12, Folate, TSH-wnl. Lipids elevated. STI testing sent at patient request , Positive for Trichomonas    Consults: Nutrition consulted and appreciate recs- no current intervention    Legal Status: Voluntary    Safety Assessment:   Behavioral Orders   Procedures     Code 1 - Restrict to Unit     Routine Programming     As clinically indicated     Self Injury Precaution     Status 15     Every 15 minutes.     Suicide precautions     Patients on Suicide Precautions should have a Combination Diet ordered that includes a Diet selection(s) AND a Behavioral Tray selection for Safe Tray - with utensils, or Safe Tray - NO utensils         Disposition: unknown pending medication management and clinical stabilization     Patient was seen with  and plan discussed with the attending    Sahara Ferguson, PGY 1  Attestation:  Faustino WELLS,  saw and evaluated the patient with the resident physician.  I agree with the findings and plan of care as documented in the resident note.  I have reviewed all labs and vital signs.

## 2020-05-01 NOTE — PROGRESS NOTES
Informed by RN about increased HR at 148. Patient feels palpitations  EKG done with sinus Tachy at 119/min   No known cardiac history. No chest pain  25 mg Atenolol to be given now   Labs ordered for CMP, magnesium and phos as well as CBC  Will have night MD follow labs    Dr TOM Young MD, [facility], [place]  Hospitalist ( Internal medicine)  Pager: [phone]

## 2020-05-02 LAB — INTERPRETATION ECG - MUSE: NORMAL

## 2020-05-02 PROCEDURE — 25000132 ZZH RX MED GY IP 250 OP 250 PS 637: Performed by: STUDENT IN AN ORGANIZED HEALTH CARE EDUCATION/TRAINING PROGRAM

## 2020-05-02 RX ADMIN — METRONIDAZOLE 500 MG: 500 TABLET ORAL at 08:33

## 2020-05-02 RX ADMIN — THIAMINE HCL TAB 100 MG 100 MG: 100 TAB at 08:34

## 2020-05-02 RX ADMIN — FOLIC ACID 1 MG: 1 TABLET ORAL at 08:33

## 2020-05-02 RX ADMIN — GUAIFENESIN 600 MG: 600 TABLET, EXTENDED RELEASE ORAL at 08:34

## 2020-05-02 RX ADMIN — LORATADINE 10 MG: 10 TABLET ORAL at 08:34

## 2020-05-02 RX ADMIN — MULTIPLE VITAMINS W/ MINERALS TAB 1 TABLET: TAB at 08:33

## 2020-05-02 RX ADMIN — FERROUS SULFATE TAB 325 MG (65 MG ELEMENTAL FE) 325 MG: 325 (65 FE) TAB at 08:34

## 2020-05-02 RX ADMIN — ACETAMINOPHEN 650 MG: 325 TABLET, FILM COATED ORAL at 08:34

## 2020-05-02 RX ADMIN — LACTULOSE 20 G: 10 SOLUTION ORAL at 08:34

## 2020-05-02 ASSESSMENT — ACTIVITIES OF DAILY LIVING (ADL)
ORAL_HYGIENE: INDEPENDENT
HYGIENE/GROOMING: INDEPENDENT;SHOWER
DRESS: STREET CLOTHES;INDEPENDENT

## 2020-05-02 NOTE — PLAN OF CARE
"Discharge:  alert and orientated x 3.  Irritable and angry after getting up this morning, wanting to be discharged and informed that she would need to meet with the attending prior to discharge.  Swearing at writer \"why wasn't this f**cking taken care of yesterday\".  Calm and pleasant after meeting with care team via Zoom.  Denies suicidal thoughts and thoughts of self harm. Reports auditory hallucinations are at her baseline \"something that is always there\".  Received written discharge instructions with verbal explanation.  Received phone numbers for treatment centers, crisis numbers and provider number, aware of need to call for placement and follow up needs.  Reviewed medications and received filled take home medications.  All questions answered, verbalizes understanding.  All belongings returned to patient. Plans to walk to [facility] home upon discharge.  Discharged home at 0950   "

## 2020-05-02 NOTE — PROGRESS NOTES
Denies SI/SIB and hallucinations. Patient remains isolative to their room and socially withdrawn while in the milieu. Presented with blunted, flat affect and a calm mood.      05/01/20 2100   Behavioral Health   Hallucinations denies / not responding to hallucinations   Thinking poor concentration;distractable   Orientation person: oriented;place: oriented;date: oriented;time: oriented   Memory baseline memory   Insight insight appropriate to events;insight appropriate to situation   Judgement impaired   Eye Contact at examiner   Affect blunted, flat   Mood mood is calm   Physical Appearance/Attire attire appropriate to age and situation;neat   Hygiene well groomed   Suicidality other (see comments)  (Denies)   1. Wish to be Dead (Recent) No   2. Non-Specific Active Suicidal Thoughts (Recent) No

## 2020-05-02 NOTE — DISCHARGE INSTRUCTIONS
Behavioral Discharge Planning and Instructions    Summary:  You were admitted on 4/23/2020  due to Alcohol Use and Auditory Hallucinations.  You were treated by Dr. Faustino Norton MD and [place] on 05/02/2020 from Station [facility] [address] [place] [id number]     Principal Diagnosis:   Schizophrenia    Health Care Follow-up Appointments:   Residential Chemical Dependency Treatment   During your hospitalization you completed a chemical dependency assessment with a recommendation for residential chemical dependency treatment. Listed below are the places you referral was sent to for placement. Please follow up with each of the agencies listed individually to schedule an admission date for treatment.     [facility]  [address] [place] [address]  Phone: [phone]    [facility]  [address] [place] [address]  Phone:[phone]    [facility]   [address] [place] [address]  Phone: [phone]    Primary Care   [facility]  [address] [place] [address]  Phone: [phone]  During the time of your discharge, the scheduling department was not available. Please call the number listed above during the week to schedule an appointment with your outpatient psychiatrist and primary care provider.      Resources   [facility] ([facility]  [address] [place] [place] [address]  Phone: [phone]    [facility]   [address] [place] [address]  Phone: [phone]  ***Please note that the clinic is currently conducting sessions via telemedicine. Please use the number listed above to call and get more information***    Attend all scheduled appointments with your outpatient providers. Call at least 24 hours in advance if you need to reschedule an appointment to ensure continued access to your outpatient providers.   Major Treatments, Procedures and Findings:  You were provided with: a psychiatric assessment, medication  "evaluation and/or management, group therapy, CD evaluation/assessment and milieu management    Symptoms to Report: feeling more aggressive, increased confusion, losing more sleep, mood getting worse or thoughts of suicide    Early warning signs can include: increased depression or anxiety sleep disturbances increased thoughts or behaviors of suicide or self-harm  increased unusual thinking, such as paranoia or hearing voices    Safety and Wellness:  Take all medicines as directed.  Make no changes unless your doctor suggests them. Follow treatment recommendations. Refrain from alcohol and non-prescribed drugs.  Ask your support system to help you reduce your access to items that could harm yourself or others. Items could include:   - Firearms  - Medicines (both prescribed and over-the-counter)  - Knives and other sharp objects  - Ropes and like materials  - Car keys  If there is a concern for safety, call 911. If there is a concern for safety, call 911.    Resources:   Crisis Intervention: 993.162.1340 or 600-219-5320 (TTY: 753.115.3178).  Call anytime for help.  [place] on Mental Illness (www.[place].jaylen.org): 636.845.9800 or 537-796-0421.  Alcoholics Anonymous (www.alcoholics-anonymous.org): Check your phone book for your local chapter.  Suicide Awareness Voices of Education (SAVE) (www.save.org): 470-352-DXKI (8249)  National Suicide Prevention Line (www.mentalhealthmn.org): 384-817-BDZL (2693)  Mental Health Consumer/Survivor Network of [place] (www.mhcsn.net): 607.411.4834 or 992-774-4375  Mental Health Association of [place] (www.mentalhealth.org): 668.936.7099 or 569-044-6946  Self- Management and Recovery Training., SMART-- Toll free: 796.339.1063  www.[email or web address].org  [facility] Crisis (COPE) Response - Adult 101-136-4559  Text 4 Life: txt \"LIFE\" to 63457 for immediate support and crisis intervention  Crisis text line: Text \"[place]\" to 979930. Free, confidential, 24/7.  Crisis Intervention: 329.540.8075 or " 734.691.4840. Call anytime for help.     The treatment team has appreciated the opportunity to work with you.     If you have any questions or concerns our unit number is [phone].

## 2020-05-07 ENCOUNTER — CARE COORDINATION (OUTPATIENT)
Dept: PSYCHIATRY | Facility: CLINIC | Age: 50
End: 2020-05-07

## 2020-05-21 ENCOUNTER — TELEPHONE (OUTPATIENT)
Dept: BEHAVIORAL HEALTH | Facility: CLINIC | Age: 50
End: 2020-05-21

## 2020-05-21 NOTE — TELEPHONE ENCOUNTER
5/21/2020  Pt's , Taina Epstein, has been in contact with me since I completed pt's CD evaluation. She stated pt is interested in MICD IOP through [facility]. Due to pt's MH dx, she is not appropriate for the CD IOP program. I referred Taina and pt to the DDP. I explained that pt needs to complete a DA for the DDP program and gave them Intake's number.    Taina's contact info:  Taina Epstein, LEONARDO, LGSW  Targeted    Mental Health Support Services  [phone]  [email or web address]  [facility]  [phone]  [email or web address]

## 2025-05-07 ENCOUNTER — APPOINTMENT (OUTPATIENT)
Dept: CT IMAGING | Facility: HOSPITAL | Age: 55
End: 2025-05-07
Attending: PHYSICIAN ASSISTANT
Payer: COMMERCIAL

## 2025-05-07 ENCOUNTER — HOSPITAL ENCOUNTER (EMERGENCY)
Facility: HOSPITAL | Age: 55
Discharge: HOME | End: 2025-05-08
Attending: EMERGENCY MEDICINE
Payer: COMMERCIAL

## 2025-05-07 DIAGNOSIS — R40.20 LOSS OF CONSCIOUSNESS (H): ICD-10-CM

## 2025-05-07 DIAGNOSIS — F10.929 ALCOHOL INTOXICATION: ICD-10-CM

## 2025-05-07 DIAGNOSIS — R45.1 AGITATION: ICD-10-CM

## 2025-05-07 LAB
ALBUMIN SERPL BCG-MCNC: 4.2 G/DL (ref 3.5–5.2)
ALP SERPL-CCNC: 70 U/L (ref 40–150)
ALT SERPL W P-5'-P-CCNC: 37 U/L (ref 0–50)
AMMONIA PLAS-SCNC: 34 UMOL/L (ref 11–51)
AMPHETAMINES UR QL SCN: NORMAL
ANION GAP SERPL CALCULATED.3IONS-SCNC: 19 MMOL/L (ref 7–15)
APAP SERPL-MCNC: <5 UG/ML (ref 10–30)
AST SERPL W P-5'-P-CCNC: 37 U/L (ref 0–45)
BARBITURATES UR QL SCN: NORMAL
BENZODIAZ UR QL SCN: NORMAL
BILIRUB DIRECT SERPL-MCNC: <0.08 MG/DL (ref 0–0.3)
BILIRUB SERPL-MCNC: <0.2 MG/DL
BUN SERPL-MCNC: 7 MG/DL (ref 6–20)
BZE UR QL SCN: NORMAL
CALCIUM SERPL-MCNC: 9.2 MG/DL (ref 8.8–10.4)
CANNABINOIDS UR QL SCN: NORMAL
CHLORIDE SERPL-SCNC: 103 MMOL/L (ref 98–107)
CREAT SERPL-MCNC: 0.47 MG/DL (ref 0.51–0.95)
EGFRCR SERPLBLD CKD-EPI 2021: >90 ML/MIN/1.73M2
ERYTHROCYTE [DISTWIDTH] IN BLOOD BY AUTOMATED COUNT: 15 % (ref 10–15)
ETHANOL SERPL-MCNC: 0.24 G/DL
FENTANYL UR QL: NORMAL
GLUCOSE SERPL-MCNC: 106 MG/DL (ref 70–99)
HCO3 SERPL-SCNC: 17 MMOL/L (ref 22–29)
HCT VFR BLD AUTO: 36.5 % (ref 35–47)
HGB BLD-MCNC: 11.7 G/DL (ref 11.7–15.7)
MCH RBC QN AUTO: 27.4 PG (ref 26.5–33)
MCHC RBC AUTO-ENTMCNC: 32.1 G/DL (ref 31.5–36.5)
MCV RBC AUTO: 86 FL (ref 78–100)
OPIATES UR QL SCN: NORMAL
PCP QUAL URINE (ROCHE): NORMAL
PLATELET # BLD AUTO: 267 10E3/UL (ref 150–450)
POTASSIUM SERPL-SCNC: 3.6 MMOL/L (ref 3.4–5.3)
PROT SERPL-MCNC: 7.7 G/DL (ref 6.4–8.3)
RBC # BLD AUTO: 4.27 10E6/UL (ref 3.8–5.2)
SALICYLATES SERPL-MCNC: <0.5 MG/DL (ref ?–30)
SODIUM SERPL-SCNC: 139 MMOL/L (ref 135–145)
WBC # BLD AUTO: 10.6 10E3/UL (ref 4–11)

## 2025-05-07 PROCEDURE — 72125 CT NECK SPINE W/O DYE: CPT

## 2025-05-07 PROCEDURE — 80307 DRUG TEST PRSMV CHEM ANLYZR: CPT | Performed by: PHYSICIAN ASSISTANT

## 2025-05-07 PROCEDURE — 80179 DRUG ASSAY SALICYLATE: CPT | Performed by: PHYSICIAN ASSISTANT

## 2025-05-07 PROCEDURE — 82140 ASSAY OF AMMONIA: CPT | Performed by: PHYSICIAN ASSISTANT

## 2025-05-07 PROCEDURE — 80143 DRUG ASSAY ACETAMINOPHEN: CPT | Performed by: PHYSICIAN ASSISTANT

## 2025-05-07 PROCEDURE — 82077 ASSAY SPEC XCP UR&BREATH IA: CPT | Performed by: PHYSICIAN ASSISTANT

## 2025-05-07 PROCEDURE — 85041 AUTOMATED RBC COUNT: CPT | Performed by: PHYSICIAN ASSISTANT

## 2025-05-07 PROCEDURE — 99285 EMERGENCY DEPT VISIT HI MDM: CPT | Mod: 25

## 2025-05-07 PROCEDURE — 36415 COLL VENOUS BLD VENIPUNCTURE: CPT | Performed by: PHYSICIAN ASSISTANT

## 2025-05-07 PROCEDURE — 70450 CT HEAD/BRAIN W/O DYE: CPT

## 2025-05-07 PROCEDURE — 250N000011 HC RX IP 250 OP 636: Mod: JZ | Performed by: PHYSICIAN ASSISTANT

## 2025-05-07 PROCEDURE — 96372 THER/PROPH/DIAG INJ SC/IM: CPT | Performed by: PHYSICIAN ASSISTANT

## 2025-05-07 PROCEDURE — 82248 BILIRUBIN DIRECT: CPT | Performed by: PHYSICIAN ASSISTANT

## 2025-05-07 RX ORDER — OLANZAPINE 10 MG/2ML
10 INJECTION, POWDER, FOR SOLUTION INTRAMUSCULAR ONCE
Status: COMPLETED | OUTPATIENT
Start: 2025-05-07 | End: 2025-05-07

## 2025-05-07 RX ADMIN — OLANZAPINE 10 MG: 10 INJECTION, POWDER, FOR SOLUTION INTRAMUSCULAR at 19:37

## 2025-05-07 ASSESSMENT — ACTIVITIES OF DAILY LIVING (ADL)
ADLS_ACUITY_SCORE: 41

## 2025-05-08 VITALS
SYSTOLIC BLOOD PRESSURE: 147 MMHG | TEMPERATURE: 97.6 F | OXYGEN SATURATION: 98 % | HEART RATE: 97 BPM | DIASTOLIC BLOOD PRESSURE: 93 MMHG | RESPIRATION RATE: 18 BRPM

## 2025-05-08 ASSESSMENT — ACTIVITIES OF DAILY LIVING (ADL)
ADLS_ACUITY_SCORE: 41

## 2025-05-08 NOTE — ED NOTES
"EMERGENCY DEPARTMENT SIGN OUT NOTE        ED COURSE AND MEDICAL DECISION MAKING  Patient was signed out to me by Dr Niki Bui at 10:47 PM.  12:55 AM patient rechecked and still sleeping.  Will plan to reassess if awake and disposition.  3:53 AM patient still sleeping following medications and has been out of restraints for some time now.  Patient not holdable based on statements earlier but still sobering including from alcohol and medications.  4:42 AM Patient signed out pending sobering.    In brief, Iliana Rendon is a 55 year old female who initially presented for evaluation of altered mental status. Patient was initially found \"unconscious\", but was awake and unresponsive to medics. Patient ripped out her IV, ripped off the BP cuff, and refused to answer SI questions. Patient yelling and threatening to \"beat up\" staff. No falls.     At time of sign out, disposition was pending reassessment.     FINAL IMPRESSION    1. Loss of consciousness (H)    2. Agitation    3. Alcohol intoxication      ED MEDS  Medications   OLANZapine (zyPREXA) injection 10 mg (10 mg Intramuscular $Given by Other 5/7/25 1937)     LAB  Labs Ordered and Resulted from Time of ED Arrival to Time of ED Departure   BASIC METABOLIC PANEL - Abnormal       Result Value    Sodium 139      Potassium 3.6      Chloride 103      Carbon Dioxide (CO2) 17 (*)     Anion Gap 19 (*)     Urea Nitrogen 7.0      Creatinine 0.47 (*)     GFR Estimate >90      Calcium 9.2      Glucose 106 (*)    ETHYL ALCOHOL LEVEL - Abnormal    Alcohol ethyl 0.24 (*)    ACETAMINOPHEN LEVEL - Abnormal    Acetaminophen <5.0 (*)    CBC WITH PLATELETS - Normal    WBC Count 10.6      RBC Count 4.27      Hemoglobin 11.7      Hematocrit 36.5      MCV 86      MCH 27.4      MCHC 32.1      RDW 15.0      Platelet Count 267     HEPATIC FUNCTION PANEL - Normal    Protein Total 7.7      Albumin 4.2      Bilirubin Total <0.2      Alkaline Phosphatase 70      AST 37      ALT 37      Bilirubin " Direct <0.08     AMMONIA - Normal    Ammonia 34     SALICYLATE LEVEL - Normal    Salicylate <0.5     URINE DRUG SCREEN PANEL - Normal    Amphetamines Urine Screen Negative      Barbituates Urine Screen Negative      Benzodiazepine Urine Screen Negative      Cannabinoids Urine Screen Negative      Cocaine Urine Screen Negative      Fentanyl Qual Urine Screen Negative      Opiates Urine Screen Negative      PCP Urine Screen Negative       RADIOLOGY    CT Cervical Spine w/o Contrast   Final Result   IMPRESSION:   1.  No fracture or posttraumatic subluxation.         Head CT w/o contrast   Final Result   IMPRESSION:   1.  Normal head CT.        DISCHARGE MEDS  New Prescriptions    No medications on file       Jeyson Cheng M.D.  [facility] EMERGENCY DEPARTMENT  [facility] [facility] [address]  [phone]         Jeyson Cheng MD  05/08/25 2511

## 2025-05-08 NOTE — ED NOTES
Per Dr. Cheng - pt is not holdable any longer, when pt wakes up and wants to leave she can be discharged

## 2025-05-08 NOTE — ED NOTES
[facility]  Expected date: 5/7/25  Expected time:   Means of arrival:   Comments:  MPWD; 55F, fall +LOC

## 2025-05-08 NOTE — ED PROVIDER NOTES
Emergency Department Midlevel Supervisory Note     I personally saw the patient and performed a substantive portion of the visit including all aspects of the medical decision making.    Impression:  1. Loss of consciousness (H)    2. Agitation    3. Alcohol intoxication            MDM / ED Course:  55-year-old female with history of schizophrenia and daily alcohol use who was brought into the ED by EMS after a fall.  The patient reportedly was drinking this evening at the time of the fall.  It was unclear how long the patient was down on the ground floor.  The patient's family later arrived to the ED to give additional details.  It was still slightly unclear how long she was unconscious for.  Upon arrival to the ED the patient was able to ambulate.  However, the patient was extremely agitated and belligerent.  The patient was informed that she would need to undergo head CT scan to ensure that she was not experiencing intracranial bleeding secondary to the fall.  Multiple attempts were made by the ED staff and myself to redirect the patient.  Unfortunately, the numerous attempts were unsuccessful.  A code was called and the patient was moved to the bed and placed appropriate restraints.  The patient did not physically fight the restraints as they were putting on.  Because she was still uncooperative the patient was then given IM Zyprexa.      The patient's blood alcohol level was 240.  The patient's acetaminophen and salicylate levels were normal.  Urine drug screen was negative.  Additional labs including CBC, BMP, hepatic panel labs, and ammonia were all reassuring.    The patient was reevaluated.  Patient was cooperative at the time of evaluation although she was still quite sedated.  The patient was taken out of the restraints.    CT scan of the head and cervical spine were both nondiagnostic.    Because she was still medically cleared and the patient had no longer needed any sort of restraints or the one-to-one.   Although she has a history of schizophrenia she did not appear to be experiencing any psychosis at the time of her initial ED presentation and there was no report of any suicidal or outside ideation.  Therefore, when the patient wakes up from the alcohol and Zyprexa she will likely be able to be discharge home.  The patient's care was turned Dr. Cheng who will reevaluate the patient once she wakes up to determine ultimate disposition.      7:30 PM Marlin Ferraro PA-C staffed patient with me. I agree with their assessment and plan of management, and I will see the patient.    7:35 PM I met with the patient to introduce myself, gather additional history, perform my initial exam, and discuss the plan.   PPE: Provider wore gloves, N95 mask, eye protection.    Brief HPI:     Iliana Rendon is a 55 year old female who presents for evaluation of altered mental status including agitation in the context of alcohol intoxication.         I, Mehrdad Penn, am serving as a scribe to document services personally performed by Dr. Niki Bui, based on my observations and the provider's statements to me.   I, Dr. Niki Bui, attest that Mehrdad Penn was acting in a scribe capacity, has observed my performance of the services and has documented them in accordance with my direction.      Brief Physical Exam:  Constitutional:  Alert, in no acute distress  EYES: Conjunctivae clear  HENT:  Atraumatic, normocephalic  Respiratory:  Respirations even, unlabored, in no acute respiratory distress  Cardiovascular:  Regular rate and rhythm, good peripheral perfusion  GI: Soft, nondistended, nontender, no palpable masses, no rebound, no guarding   Musculoskeletal:  No edema. No cyanosis. Range of motion major extremities intact.    Integument: Warm, Dry, No erythema, No rash.   Neurologic:  Alert & oriented, no focal deficits noted  Psych: Normal mood and affect         Labs and Imaging:  Results for orders placed or performed during  the hospital encounter of 05/07/25   CBC (+ platelets, no diff)   Result Value Ref Range    WBC Count 10.6 4.0 - 11.0 10e3/uL    RBC Count 4.27 3.80 - 5.20 10e6/uL    Hemoglobin 11.7 11.7 - 15.7 g/dL    Hematocrit 36.5 35.0 - 47.0 %    MCV 86 78 - 100 fL    MCH 27.4 26.5 - 33.0 pg    MCHC 32.1 31.5 - 36.5 g/dL    RDW 15.0 10.0 - 15.0 %    Platelet Count 267 150 - 450 10e3/uL     I have reviewed the relevant laboratory and radiology studies    Procedures:  I was present for the key portions of this procedure: none      Dr. Niki Bui  [facility] EMERGENCY DEPARTMENT  [address] [place] [id number] [phone]      Niki Bui,   05/07/25 2346

## 2025-05-08 NOTE — CARE PLAN
05/07/25 2044   Aggressive/Violent Post Event Doc   When was the event?  05/07/25   Where was the event?  [facility]   What was event? Aggressive and verbally abusive behavior

## 2025-05-08 NOTE — ED PROVIDER NOTES
EMERGENCY DEPARTMENT ENCOUNTER      NAME: Iliana Rendon  AGE: 55 year old female  YOB: 1970  MRN: [id number]  EVALUATION DATE & TIME: 2025  7:03 PM    PCP: No primary care provider on file.    ED PROVIDER: Marlin Tobias PA-C      Chief Complaint   Patient presents with    Altered Mental Status         FINAL IMPRESSION:  1. Loss of consciousness (H)    2. Agitation    3. Alcohol intoxication          ED COURSE & MEDICAL DECISION MAKIN:13 PM I introduced myself to patient, performed initial HPI and examination.   7:33 PM CODE GREEN, in restraints. Staffed with Dr. Bui  7:37 PM Patient in restraints. Thrusting pelvis in the bed making sexual comments and sounds. IM Zyprexa given.   7:43 PM Rechecked patient, resting comfortably. In restraints still.   8:15 PM Rechecked patient.   8:43 PM Family here. Received collateral from daughter. FROILAN [phone]  9:40 PM Rechecked patient, restraints removed. Cooperative. CT negative. Can now see care everywhere but no recent information for past 5 years.     55 year old female with no known PMH presents to the Emergency Department for evaluation of found down unconscious.  History is very limited due to patient's cooperation and no family to provide collateral.  No contact information in her chart for daughter who supposedly called EMS.  Per EMS patient was found down in a parking lot.  Family supposedly stated that she did not actually fall and that she sat and then laid down.  Patient was refusing to answer verbally for EMS but was awake.  Upon arrival to the hospital patient was agitated, yelling at staff.  Slamming her hand on the wall as she ambulated to the bathroom.  Does endorse some alcohol but does not provide any additional information.  Denies any pain or injuries but cannot recall the events of tonight.    Ultimately patient is uncooperative and agitated, concern for loss of consciousness without additional information and  potential intoxication/substance abuse.  Given concern for patient's safety, she was placed on a hold and placed in restraints.  Patient ultimately required IM Zyprexa as well as she was then yelling out and making very hypersexual movements and statements/sounds.    Family does arrive later and reports that patient was drinking beer tonight.  States she drinks regularly, has been to rehab previously.  Would have concern for withdrawal if she were to stop abruptly.  No history of seizures to the best of their knowledge.  States she all of a sudden became upset, pulled her close from the wash and went to walk outside when she lost her balance and slumped backwards.  Family does not think she hit her head but cannot be certain.  States she lost consciousness for period of time until ambulance arrived.  Unsure how long she was out for.  Family does add that she typically is seen at [facility].  Has a history of bipolar and schizophrenia.  Believes she has been compliant with her medicines.  No concern for acute decompensation with her mental health but states it is near the anniversary of her mother's death in Mother's Day which tends to be harder.  Does not believe she is on any blood thinning medicines.    Lives by herself with a cat but daughter helps with cares.     Workup is in process at time of evaluation.  High suspicion for acute alcohol intoxication.  Workup to include drug screen and possible other ingestions (salicylate levels).  CT head and neck pending to evaluate for potential traumatic injuries or additional causes to her altered mental status/agitation.    Patient signed out to Dr. Bui for follow-up on disposition.  Anticipate patient will boarding the emergency department until completion of workup, clinical sobriety, and reassessment.        Medical Decision Making      Admission considered. Patient was signed out to the oncoming physician, disposition pending.    MIPS (CTPE, Dental pain, Meier, Sinusitis,  Asthma/COPD, Head Trauma): Adult Minor Head Trauma:Drug or alcohol intoxication    SEPSIS: None            MEDICATIONS GIVEN IN THE EMERGENCY:  Medications   OLANZapine (zyPREXA) injection 10 mg (10 mg Intramuscular $Given by Other 5/7/25 1937)       NEW PRESCRIPTIONS STARTED AT TODAY'S ER VISIT  New Prescriptions    No medications on file          =================================================================    HPI    Patient information was obtained from: EMS, Patient     Use of : N/A         Iliana Rendon is a 55 year old female with no pertinent PMH who presents to this ED by EMS for evaluation of altered mental status, presumed alcohol intoxication with LOC.  Per EMS, patient comes from the parking lot by a [facility].  Initially was found unconscious, would not respond with words to medic although she was noted to be awake.  Reportedly patient did not fall but sat down and then subsequently evaluated down.  Supposedly called EMS.    Patient is very agitated on my initial assessment.  Sitting at the edge of the bed.  Refusing to cooperate with questioning but ultimately does say she does not have any pain.  Does not recall what happened tonight or how she ended up here.  Does endorse alcohol use tonight.  Can recall that she is at a hospital and what the date and year is.  When asked about her daughter and contact information, patient refuses to answer as she states we should already have that information if she is here.    Typically ambulates using a walker or a cane and does have a wheelchair available to her.          REVIEW OF SYSTEMS   ROS negative unless otherwise stated in HPI    PAST MEDICAL HISTORY:  No past medical history on file.    PAST SURGICAL HISTORY:  No past surgical history on file.    CURRENT MEDICATIONS:    No current outpatient medications on file.      ALLERGIES:  No Known Allergies    FAMILY HISTORY:  No family history on file.    SOCIAL HISTORY:        VITALS:  BP  (!) 147/93   Pulse 108   Temp 97.6  F (36.4  C) (Oral)   Resp 18   SpO2 100%     PHYSICAL EXAM    Constitutional: Alert  HENT: Normocephalic, Atraumatic  Neck- Supple, Nontender. Normal ROM.   Eyes: Conjunctiva normal. PERRL.   Respiratory: No respiratory distress, speaking in full sentences.   Cardiovascular: Normal heart rate  Musculoskeletal: No deformities, Moves all extremities equally. Ambulates with unsteady gait without use of assistive devices  Integument: Warm, Dry, No erythema, ecchymosis, or rash.  Neurologic: Alert & oriented x 3. No focal deficits  Psychiatric: Agitated and uncooperative     LAB:  All pertinent labs reviewed and interpreted.  Results for orders placed or performed during the hospital encounter of 05/07/25   CBC (+ platelets, no diff)   Result Value Ref Range    WBC Count 10.6 4.0 - 11.0 10e3/uL    RBC Count 4.27 3.80 - 5.20 10e6/uL    Hemoglobin 11.7 11.7 - 15.7 g/dL    Hematocrit 36.5 35.0 - 47.0 %    MCV 86 78 - 100 fL    MCH 27.4 26.5 - 33.0 pg    MCHC 32.1 31.5 - 36.5 g/dL    RDW 15.0 10.0 - 15.0 %    Platelet Count 267 150 - 450 10e3/uL       RADIOLOGY:  Reviewed all pertinent imaging. Please see official radiology report.  Head CT w/o contrast    (Results Pending)   CT Cervical Spine w/o Contrast    (Results Pending)       EKG:    None    PROCEDURES:   None        Marlin Tobias PA-C  Emergency Medicine  [facility] EMERGENCY DEPARTMENT  [address] [place] [id number] [phone]             Marlin Tobias PA-C  05/07/25 5574       Marlin Tobias PA-C  05/07/25 7069

## 2025-05-08 NOTE — ED NOTES
Patient does not need 1:1 per Dr. Bui.  Patient is cooperative when awake. Sleeping at this time.

## 2025-05-08 NOTE — ED NOTES
Patient is calm in bed, answering yes/no questions. Restraints removed following explanation of expected behavior and patient agreement. Patient transported to CT with two security officers and SWAT nurse for imaging.